# Patient Record
Sex: FEMALE | Race: WHITE | NOT HISPANIC OR LATINO | URBAN - METROPOLITAN AREA
[De-identification: names, ages, dates, MRNs, and addresses within clinical notes are randomized per-mention and may not be internally consistent; named-entity substitution may affect disease eponyms.]

---

## 2018-04-26 ENCOUNTER — INPATIENT (INPATIENT)
Facility: HOSPITAL | Age: 28
LOS: 1 days | Discharge: HOME | End: 2018-04-28
Attending: OBSTETRICS & GYNECOLOGY | Admitting: OBSTETRICS & GYNECOLOGY

## 2018-04-26 VITALS — TEMPERATURE: 99 F | DIASTOLIC BLOOD PRESSURE: 68 MMHG | RESPIRATION RATE: 18 BRPM | SYSTOLIC BLOOD PRESSURE: 121 MMHG

## 2018-04-26 LAB
BASOPHILS # BLD AUTO: 0.02 K/UL — SIGNIFICANT CHANGE UP (ref 0–0.2)
BASOPHILS NFR BLD AUTO: 0.2 % — SIGNIFICANT CHANGE UP (ref 0–1)
BLD GP AB SCN SERPL QL: SIGNIFICANT CHANGE UP
EOSINOPHIL # BLD AUTO: 0.07 K/UL — SIGNIFICANT CHANGE UP (ref 0–0.7)
EOSINOPHIL NFR BLD AUTO: 0.8 % — SIGNIFICANT CHANGE UP (ref 0–8)
HCT VFR BLD CALC: 28.5 % — LOW (ref 37–47)
HGB BLD-MCNC: 9 G/DL — LOW (ref 12–16)
IMM GRANULOCYTES NFR BLD AUTO: 0.6 % — HIGH (ref 0.1–0.3)
LYMPHOCYTES # BLD AUTO: 1.81 K/UL — SIGNIFICANT CHANGE UP (ref 1.2–3.4)
LYMPHOCYTES # BLD AUTO: 20.1 % — LOW (ref 20.5–51.1)
MCHC RBC-ENTMCNC: 25.5 PG — LOW (ref 27–31)
MCHC RBC-ENTMCNC: 31.6 G/DL — LOW (ref 32–37)
MCV RBC AUTO: 80.7 FL — LOW (ref 81–99)
MONOCYTES # BLD AUTO: 0.6 K/UL — SIGNIFICANT CHANGE UP (ref 0.1–0.6)
MONOCYTES NFR BLD AUTO: 6.7 % — SIGNIFICANT CHANGE UP (ref 1.7–9.3)
NEUTROPHILS # BLD AUTO: 6.45 K/UL — SIGNIFICANT CHANGE UP (ref 1.4–6.5)
NEUTROPHILS NFR BLD AUTO: 71.6 % — SIGNIFICANT CHANGE UP (ref 42.2–75.2)
NRBC # BLD: 0 /100 WBCS — SIGNIFICANT CHANGE UP (ref 0–0)
PLATELET # BLD AUTO: 197 K/UL — SIGNIFICANT CHANGE UP (ref 130–400)
PRENATAL SYPHILIS TEST: SIGNIFICANT CHANGE UP
RBC # BLD: 3.53 M/UL — LOW (ref 4.2–5.4)
RBC # FLD: 15.9 % — HIGH (ref 11.5–14.5)
TYPE + AB SCN PNL BLD: SIGNIFICANT CHANGE UP
WBC # BLD: 9 K/UL — SIGNIFICANT CHANGE UP (ref 4.8–10.8)
WBC # FLD AUTO: 9 K/UL — SIGNIFICANT CHANGE UP (ref 4.8–10.8)

## 2018-04-26 RX ORDER — ACETAMINOPHEN 500 MG
650 TABLET ORAL EVERY 6 HOURS
Qty: 0 | Refills: 0 | Status: DISCONTINUED | OUTPATIENT
Start: 2018-04-26 | End: 2018-04-28

## 2018-04-26 RX ORDER — DOCUSATE SODIUM 100 MG
100 CAPSULE ORAL
Qty: 0 | Refills: 0 | Status: DISCONTINUED | OUTPATIENT
Start: 2018-04-26 | End: 2018-04-28

## 2018-04-26 RX ORDER — SIMETHICONE 80 MG/1
80 TABLET, CHEWABLE ORAL EVERY 6 HOURS
Qty: 0 | Refills: 0 | Status: DISCONTINUED | OUTPATIENT
Start: 2018-04-26 | End: 2018-04-28

## 2018-04-26 RX ORDER — PRAMOXINE HYDROCHLORIDE 150 MG/15G
1 AEROSOL, FOAM RECTAL EVERY 4 HOURS
Qty: 0 | Refills: 0 | Status: DISCONTINUED | OUTPATIENT
Start: 2018-04-26 | End: 2018-04-28

## 2018-04-26 RX ORDER — MAGNESIUM HYDROXIDE 400 MG/1
30 TABLET, CHEWABLE ORAL
Qty: 0 | Refills: 0 | Status: DISCONTINUED | OUTPATIENT
Start: 2018-04-26 | End: 2018-04-28

## 2018-04-26 RX ORDER — LANOLIN
1 OINTMENT (GRAM) TOPICAL EVERY 6 HOURS
Qty: 0 | Refills: 0 | Status: DISCONTINUED | OUTPATIENT
Start: 2018-04-26 | End: 2018-04-28

## 2018-04-26 RX ORDER — DIPHENHYDRAMINE HCL 50 MG
25 CAPSULE ORAL EVERY 6 HOURS
Qty: 0 | Refills: 0 | Status: DISCONTINUED | OUTPATIENT
Start: 2018-04-26 | End: 2018-04-28

## 2018-04-26 RX ORDER — GLYCERIN ADULT
1 SUPPOSITORY, RECTAL RECTAL AT BEDTIME
Qty: 0 | Refills: 0 | Status: DISCONTINUED | OUTPATIENT
Start: 2018-04-26 | End: 2018-04-28

## 2018-04-26 RX ORDER — AER TRAVELER 0.5 G/1
1 SOLUTION RECTAL; TOPICAL EVERY 4 HOURS
Qty: 0 | Refills: 0 | Status: DISCONTINUED | OUTPATIENT
Start: 2018-04-26 | End: 2018-04-28

## 2018-04-26 RX ORDER — IBUPROFEN 200 MG
600 TABLET ORAL EVERY 6 HOURS
Qty: 0 | Refills: 0 | Status: DISCONTINUED | OUTPATIENT
Start: 2018-04-26 | End: 2018-04-28

## 2018-04-26 RX ORDER — OXYCODONE AND ACETAMINOPHEN 5; 325 MG/1; MG/1
1 TABLET ORAL
Qty: 0 | Refills: 0 | Status: DISCONTINUED | OUTPATIENT
Start: 2018-04-26 | End: 2018-04-28

## 2018-04-26 RX ORDER — HYDROCORTISONE 1 %
1 OINTMENT (GRAM) TOPICAL EVERY 4 HOURS
Qty: 0 | Refills: 0 | Status: DISCONTINUED | OUTPATIENT
Start: 2018-04-26 | End: 2018-04-28

## 2018-04-26 RX ORDER — SODIUM CHLORIDE 9 MG/ML
3 INJECTION INTRAMUSCULAR; INTRAVENOUS; SUBCUTANEOUS EVERY 8 HOURS
Qty: 0 | Refills: 0 | Status: DISCONTINUED | OUTPATIENT
Start: 2018-04-26 | End: 2018-04-28

## 2018-04-26 RX ORDER — DIBUCAINE 1 %
1 OINTMENT (GRAM) RECTAL EVERY 4 HOURS
Qty: 0 | Refills: 0 | Status: DISCONTINUED | OUTPATIENT
Start: 2018-04-26 | End: 2018-04-28

## 2018-04-26 RX ORDER — OXYTOCIN 10 UNIT/ML
41.67 VIAL (ML) INJECTION
Qty: 20 | Refills: 0 | Status: DISCONTINUED | OUTPATIENT
Start: 2018-04-26 | End: 2018-04-28

## 2018-04-26 RX ADMIN — Medication 1 TABLET(S): at 21:40

## 2018-04-26 RX ADMIN — Medication 600 MILLIGRAM(S): at 17:04

## 2018-04-26 RX ADMIN — SODIUM CHLORIDE 3 MILLILITER(S): 9 INJECTION INTRAMUSCULAR; INTRAVENOUS; SUBCUTANEOUS at 21:39

## 2018-04-26 NOTE — OB PROVIDER H&P - NSHPPHYSICALEXAM_GEN_ALL_CORE
Vital Signs Last 24 Hrs  T(C): --  T(F): --  HR: 100 (26 Apr 2018 16:16) (100 - 100)  BP: 148/60 (26 Apr 2018 16:16) (148/60 - 148/60)  Infant in mothers arm, placenta still in situ

## 2018-04-26 NOTE — OB PROVIDER H&P - HISTORY OF PRESENT ILLNESS
26 yo  s/p  at ambulance en route to hospital. Reports CTX since this morning and LOF. Denies any complications in this pregnancy.

## 2018-04-26 NOTE — OB PROVIDER H&P - NSHPLABSRESULTS_GEN_ALL_CORE
Declined anatomy scan  Office sonogram: Posterior placenta, 3VC, 4CH, EFW 354g (45%ile)    GCT 85  Refused MSAFP

## 2018-04-26 NOTE — OB PROVIDER DELIVERY SUMMARY - NSPROVIDERDELIVERYNOTE_OBGYN_ALL_OB_FT
Patient had extramural delivery of male infant, apgars 9 and 9 via [facility] ambulance. Placenta delivered intact spontaneously. Uterine massage and pitocin started. Lidocaine injected into laceration. Second degree laceration repaired with 3-0 chromic. Good hemostasis obtained.  Pateint tolerated procedure well.

## 2018-04-26 NOTE — OB PROVIDER H&P - ASSESSMENT
26 yo  s/p extramural , doing well  - pain management prn  - transfer to 4D  - regular diet    Dr. Dangelo at bedside

## 2018-04-27 LAB
BASOPHILS # BLD AUTO: 0.02 K/UL — SIGNIFICANT CHANGE UP (ref 0–0.2)
BASOPHILS NFR BLD AUTO: 0.2 % — SIGNIFICANT CHANGE UP (ref 0–1)
EOSINOPHIL # BLD AUTO: 0.1 K/UL — SIGNIFICANT CHANGE UP (ref 0–0.7)
EOSINOPHIL NFR BLD AUTO: 1.1 % — SIGNIFICANT CHANGE UP (ref 0–8)
HCT VFR BLD CALC: 26.1 % — LOW (ref 37–47)
HGB BLD-MCNC: 8.3 G/DL — LOW (ref 12–16)
IMM GRANULOCYTES NFR BLD AUTO: 0.6 % — HIGH (ref 0.1–0.3)
LYMPHOCYTES # BLD AUTO: 2.68 K/UL — SIGNIFICANT CHANGE UP (ref 1.2–3.4)
LYMPHOCYTES # BLD AUTO: 30.1 % — SIGNIFICANT CHANGE UP (ref 20.5–51.1)
MCHC RBC-ENTMCNC: 25.9 PG — LOW (ref 27–31)
MCHC RBC-ENTMCNC: 31.8 G/DL — LOW (ref 32–37)
MCV RBC AUTO: 81.3 FL — SIGNIFICANT CHANGE UP (ref 81–99)
MONOCYTES # BLD AUTO: 0.62 K/UL — HIGH (ref 0.1–0.6)
MONOCYTES NFR BLD AUTO: 7 % — SIGNIFICANT CHANGE UP (ref 1.7–9.3)
NEUTROPHILS # BLD AUTO: 5.43 K/UL — SIGNIFICANT CHANGE UP (ref 1.4–6.5)
NEUTROPHILS NFR BLD AUTO: 61 % — SIGNIFICANT CHANGE UP (ref 42.2–75.2)
NRBC # BLD: 0 /100 WBCS — SIGNIFICANT CHANGE UP (ref 0–0)
PLATELET # BLD AUTO: 191 K/UL — SIGNIFICANT CHANGE UP (ref 130–400)
RBC # BLD: 3.21 M/UL — LOW (ref 4.2–5.4)
RBC # FLD: 16 % — HIGH (ref 11.5–14.5)
WBC # BLD: 8.9 K/UL — SIGNIFICANT CHANGE UP (ref 4.8–10.8)
WBC # FLD AUTO: 8.9 K/UL — SIGNIFICANT CHANGE UP (ref 4.8–10.8)

## 2018-04-27 RX ADMIN — Medication 600 MILLIGRAM(S): at 16:48

## 2018-04-27 RX ADMIN — Medication 600 MILLIGRAM(S): at 17:20

## 2018-04-27 RX ADMIN — Medication 600 MILLIGRAM(S): at 04:57

## 2018-04-27 RX ADMIN — Medication 600 MILLIGRAM(S): at 05:45

## 2018-04-27 RX ADMIN — Medication 1 TABLET(S): at 11:20

## 2018-04-27 RX ADMIN — SODIUM CHLORIDE 3 MILLILITER(S): 9 INJECTION INTRAMUSCULAR; INTRAVENOUS; SUBCUTANEOUS at 14:11

## 2018-04-27 NOTE — PROGRESS NOTE ADULT - SUBJECTIVE AND OBJECTIVE BOX
OB attending  PPD #1    Pt doing well, pain well controlled. No overnight events, no acute complaints.    Ambulating: Yes  Voiding: Yes  Flatus: Yes  Bowel movements: Yes   Breast or bottle feeding: Breastfeeding  Diet: Regular    PAST MEDICAL & SURGICAL HISTORY:  No pertinent past medical history  No significant past surgical history    Physical Exam  Vital Signs Last 24 Hrs  T(C): 35.6 (2018 00:46), Max: 37 (2018 16:00)  T(F): 96.1 (2018 00:46), Max: 98.6 (2018 16:00)  HR: 82 (2018 00:46) (65 - 100)  BP: 110/54 (2018 00:46) (110/54 - 148/60)  BP(mean): --  RR: 19 (2018 00:46) (18 - 20)  SpO2: --  Gen: AAOx3, NAD  Abd: Soft, nontender, nondistended, BS+  Fundus: Firm, below umbilicus  Lochia: normal  Ext: No calf tenderness, no swelling    Labs:                        9.0    9.00  )-----------( 197      ( 2018 16:46 )             28.5       A/P:26 yo , s/p extramural , PPD #1, doing well  - continue current management

## 2018-04-28 ENCOUNTER — TRANSCRIPTION ENCOUNTER (OUTPATIENT)
Age: 28
End: 2018-04-28

## 2018-04-28 VITALS
SYSTOLIC BLOOD PRESSURE: 112 MMHG | RESPIRATION RATE: 18 BRPM | DIASTOLIC BLOOD PRESSURE: 55 MMHG | HEART RATE: 86 BPM | TEMPERATURE: 98 F

## 2018-04-28 RX ORDER — IBUPROFEN 200 MG
1 TABLET ORAL
Qty: 0 | Refills: 0 | DISCHARGE
Start: 2018-04-28

## 2018-04-28 RX ADMIN — Medication 600 MILLIGRAM(S): at 08:42

## 2018-04-28 RX ADMIN — Medication 600 MILLIGRAM(S): at 19:37

## 2018-04-28 NOTE — DISCHARGE NOTE OB - PATIENT PORTAL LINK FT
You can access the [facility], offered by [facility], by registering with the following website: [email or web address]

## 2018-04-28 NOTE — DISCHARGE NOTE OB - PLAN OF CARE
healthy patient nothing in the vagina for 6 weeks, no tampons, no douching, no baths, no sex. Showers are fine.

## 2018-04-28 NOTE — DISCHARGE NOTE OB - MEDICATION SUMMARY - MEDICATIONS TO TAKE
I will START or STAY ON the medications listed below when I get home from the hospital:    ibuprofen 600 mg oral tablet  -- 1 tab(s) by mouth every 6 hours, As needed, Moderate Pain  -- Indication: For EXTRA MURAL

## 2018-04-28 NOTE — DISCHARGE NOTE OB - CARE PLAN
Principal Discharge DX:	Vaginal delivery  Goal:	healthy patient  Assessment and plan of treatment:	nothing in the vagina for 6 weeks, no tampons, no douching, no baths, no sex. Showers are fine.

## 2018-04-28 NOTE — DISCHARGE NOTE OB - HOSPITAL COURSE
extramural delivery, uncomplicated postpartum stay                        8.3    8.90  )-----------( 191      ( 27 Apr 2018 18:18 )             26.1

## 2018-04-28 NOTE — DISCHARGE NOTE OB - CARE PROVIDER_API CALL
Darrell Cool), Obstetrics and Gynecology  [address] [place] [address]  Phone: [phone]  Fax: [phone]

## 2018-05-03 DIAGNOSIS — Z3A.41 41 WEEKS GESTATION OF PREGNANCY: ICD-10-CM

## 2020-05-13 ENCOUNTER — ASOB RESULT (OUTPATIENT)
Age: 30
End: 2020-05-13

## 2020-05-13 ENCOUNTER — APPOINTMENT (OUTPATIENT)
Dept: ANTEPARTUM | Facility: CLINIC | Age: 30
End: 2020-05-13
Payer: MEDICAID

## 2020-05-13 PROCEDURE — 76811 OB US DETAILED SNGL FETUS: CPT

## 2020-09-30 ENCOUNTER — INPATIENT (INPATIENT)
Facility: HOSPITAL | Age: 30
LOS: 1 days | Discharge: HOME | End: 2020-10-02
Attending: OBSTETRICS & GYNECOLOGY | Admitting: OBSTETRICS & GYNECOLOGY
Payer: MEDICAID

## 2020-09-30 ENCOUNTER — OUTPATIENT (OUTPATIENT)
Dept: OUTPATIENT SERVICES | Facility: HOSPITAL | Age: 30
LOS: 1 days | Discharge: HOME | End: 2020-09-30

## 2020-09-30 VITALS
HEART RATE: 82 BPM | DIASTOLIC BLOOD PRESSURE: 68 MMHG | RESPIRATION RATE: 16 BRPM | SYSTOLIC BLOOD PRESSURE: 118 MMHG | TEMPERATURE: 98 F

## 2020-09-30 VITALS — SYSTOLIC BLOOD PRESSURE: 118 MMHG | TEMPERATURE: 99 F | DIASTOLIC BLOOD PRESSURE: 68 MMHG

## 2020-09-30 VITALS — DIASTOLIC BLOOD PRESSURE: 67 MMHG | HEART RATE: 116 BPM | SYSTOLIC BLOOD PRESSURE: 129 MMHG

## 2020-09-30 LAB
AMPHET UR-MCNC: NEGATIVE — SIGNIFICANT CHANGE UP
APPEARANCE UR: ABNORMAL
BACTERIA # UR AUTO: NEGATIVE — SIGNIFICANT CHANGE UP
BARBITURATES UR SCN-MCNC: NEGATIVE — SIGNIFICANT CHANGE UP
BASOPHILS # BLD AUTO: 0.04 K/UL — SIGNIFICANT CHANGE UP (ref 0–0.2)
BASOPHILS NFR BLD AUTO: 0.4 % — SIGNIFICANT CHANGE UP (ref 0–1)
BENZODIAZ UR-MCNC: NEGATIVE — SIGNIFICANT CHANGE UP
BILIRUB UR-MCNC: NEGATIVE — SIGNIFICANT CHANGE UP
BLD GP AB SCN SERPL QL: SIGNIFICANT CHANGE UP
BUPRENORPHINE SCREEN, URINE RESULT: NEGATIVE — SIGNIFICANT CHANGE UP
COCAINE METAB.OTHER UR-MCNC: NEGATIVE — SIGNIFICANT CHANGE UP
COLOR SPEC: SIGNIFICANT CHANGE UP
DIFF PNL FLD: SIGNIFICANT CHANGE UP
EOSINOPHIL # BLD AUTO: 0.11 K/UL — SIGNIFICANT CHANGE UP (ref 0–0.7)
EOSINOPHIL NFR BLD AUTO: 1.2 % — SIGNIFICANT CHANGE UP (ref 0–8)
EPI CELLS # UR: 3 /HPF — SIGNIFICANT CHANGE UP (ref 0–5)
FENTANYL UR QL: NEGATIVE — SIGNIFICANT CHANGE UP
GLUCOSE UR QL: NEGATIVE — SIGNIFICANT CHANGE UP
HCT VFR BLD CALC: 37.6 % — SIGNIFICANT CHANGE UP (ref 37–47)
HGB BLD-MCNC: 12.2 G/DL — SIGNIFICANT CHANGE UP (ref 12–16)
HYALINE CASTS # UR AUTO: 3 /LPF — SIGNIFICANT CHANGE UP (ref 0–7)
IMM GRANULOCYTES NFR BLD AUTO: 0.8 % — HIGH (ref 0.1–0.3)
KETONES UR-MCNC: NEGATIVE — SIGNIFICANT CHANGE UP
L&D DRUG SCREEN, URINE: SIGNIFICANT CHANGE UP
LEUKOCYTE ESTERASE UR-ACNC: NEGATIVE — SIGNIFICANT CHANGE UP
LYMPHOCYTES # BLD AUTO: 2.46 K/UL — SIGNIFICANT CHANGE UP (ref 1.2–3.4)
LYMPHOCYTES # BLD AUTO: 25.8 % — SIGNIFICANT CHANGE UP (ref 20.5–51.1)
MCHC RBC-ENTMCNC: 28.9 PG — SIGNIFICANT CHANGE UP (ref 27–31)
MCHC RBC-ENTMCNC: 32.4 G/DL — SIGNIFICANT CHANGE UP (ref 32–37)
MCV RBC AUTO: 89.1 FL — SIGNIFICANT CHANGE UP (ref 81–99)
METHADONE UR-MCNC: NEGATIVE — SIGNIFICANT CHANGE UP
MONOCYTES # BLD AUTO: 0.85 K/UL — HIGH (ref 0.1–0.6)
MONOCYTES NFR BLD AUTO: 8.9 % — SIGNIFICANT CHANGE UP (ref 1.7–9.3)
NEUTROPHILS # BLD AUTO: 6.01 K/UL — SIGNIFICANT CHANGE UP (ref 1.4–6.5)
NEUTROPHILS NFR BLD AUTO: 62.9 % — SIGNIFICANT CHANGE UP (ref 42.2–75.2)
NITRITE UR-MCNC: NEGATIVE — SIGNIFICANT CHANGE UP
NRBC # BLD: 0 /100 WBCS — SIGNIFICANT CHANGE UP (ref 0–0)
OPIATES UR-MCNC: NEGATIVE — SIGNIFICANT CHANGE UP
OXYCODONE UR-MCNC: NEGATIVE — SIGNIFICANT CHANGE UP
PCP UR-MCNC: NEGATIVE — SIGNIFICANT CHANGE UP
PH UR: 7 — SIGNIFICANT CHANGE UP (ref 5–8)
PLATELET # BLD AUTO: 193 K/UL — SIGNIFICANT CHANGE UP (ref 130–400)
PRENATAL SYPHILIS TEST: SIGNIFICANT CHANGE UP
PROPOXYPHENE QUALITATIVE URINE RESULT: NEGATIVE — SIGNIFICANT CHANGE UP
PROT UR-MCNC: SIGNIFICANT CHANGE UP
RBC # BLD: 4.22 M/UL — SIGNIFICANT CHANGE UP (ref 4.2–5.4)
RBC # FLD: 14.6 % — HIGH (ref 11.5–14.5)
RBC CASTS # UR COMP ASSIST: 4 /HPF — SIGNIFICANT CHANGE UP (ref 0–4)
SARS-COV-2 RNA SPEC QL NAA+PROBE: SIGNIFICANT CHANGE UP
SP GR SPEC: 1.01 — SIGNIFICANT CHANGE UP (ref 1.01–1.03)
UROBILINOGEN FLD QL: SIGNIFICANT CHANGE UP
WBC # BLD: 9.55 K/UL — SIGNIFICANT CHANGE UP (ref 4.8–10.8)
WBC # FLD AUTO: 9.55 K/UL — SIGNIFICANT CHANGE UP (ref 4.8–10.8)
WBC UR QL: 2 /HPF — SIGNIFICANT CHANGE UP (ref 0–5)

## 2020-09-30 PROCEDURE — 59400 OBSTETRICAL CARE: CPT | Mod: U9

## 2020-09-30 RX ORDER — OXYCODONE HYDROCHLORIDE 5 MG/1
5 TABLET ORAL
Refills: 0 | Status: DISCONTINUED | OUTPATIENT
Start: 2020-09-30 | End: 2020-10-02

## 2020-09-30 RX ORDER — NALOXONE HYDROCHLORIDE 4 MG/.1ML
0.1 SPRAY NASAL
Refills: 0 | Status: DISCONTINUED | OUTPATIENT
Start: 2020-09-30 | End: 2020-10-02

## 2020-09-30 RX ORDER — LANOLIN
1 OINTMENT (GRAM) TOPICAL EVERY 6 HOURS
Refills: 0 | Status: DISCONTINUED | OUTPATIENT
Start: 2020-09-30 | End: 2020-10-02

## 2020-09-30 RX ORDER — DEXAMETHASONE 0.5 MG/5ML
4 ELIXIR ORAL EVERY 6 HOURS
Refills: 0 | Status: DISCONTINUED | OUTPATIENT
Start: 2020-09-30 | End: 2020-10-02

## 2020-09-30 RX ORDER — DIBUCAINE 1 %
1 OINTMENT (GRAM) RECTAL EVERY 6 HOURS
Refills: 0 | Status: DISCONTINUED | OUTPATIENT
Start: 2020-09-30 | End: 2020-10-02

## 2020-09-30 RX ORDER — HYDROCORTISONE 1 %
1 OINTMENT (GRAM) TOPICAL EVERY 6 HOURS
Refills: 0 | Status: DISCONTINUED | OUTPATIENT
Start: 2020-09-30 | End: 2020-10-02

## 2020-09-30 RX ORDER — IBUPROFEN 200 MG
600 TABLET ORAL EVERY 6 HOURS
Refills: 0 | Status: COMPLETED | OUTPATIENT
Start: 2020-09-30 | End: 2021-08-29

## 2020-09-30 RX ORDER — AER TRAVELER 0.5 G/1
1 SOLUTION RECTAL; TOPICAL EVERY 4 HOURS
Refills: 0 | Status: DISCONTINUED | OUTPATIENT
Start: 2020-09-30 | End: 2020-10-02

## 2020-09-30 RX ORDER — OXYTOCIN 10 UNIT/ML
333.33 VIAL (ML) INJECTION
Qty: 20 | Refills: 0 | Status: DISCONTINUED | OUTPATIENT
Start: 2020-09-30 | End: 2020-10-02

## 2020-09-30 RX ORDER — DIPHENHYDRAMINE HCL 50 MG
25 CAPSULE ORAL EVERY 6 HOURS
Refills: 0 | Status: DISCONTINUED | OUTPATIENT
Start: 2020-09-30 | End: 2020-10-02

## 2020-09-30 RX ORDER — KETOROLAC TROMETHAMINE 30 MG/ML
30 SYRINGE (ML) INJECTION ONCE
Refills: 0 | Status: DISCONTINUED | OUTPATIENT
Start: 2020-09-30 | End: 2020-09-30

## 2020-09-30 RX ORDER — SIMETHICONE 80 MG/1
80 TABLET, CHEWABLE ORAL EVERY 4 HOURS
Refills: 0 | Status: DISCONTINUED | OUTPATIENT
Start: 2020-09-30 | End: 2020-10-02

## 2020-09-30 RX ORDER — SODIUM CHLORIDE 9 MG/ML
1000 INJECTION, SOLUTION INTRAVENOUS
Refills: 0 | Status: DISCONTINUED | OUTPATIENT
Start: 2020-09-30 | End: 2020-09-30

## 2020-09-30 RX ORDER — IBUPROFEN 200 MG
600 TABLET ORAL EVERY 6 HOURS
Refills: 0 | Status: DISCONTINUED | OUTPATIENT
Start: 2020-09-30 | End: 2020-10-02

## 2020-09-30 RX ORDER — ONDANSETRON 8 MG/1
4 TABLET, FILM COATED ORAL EVERY 6 HOURS
Refills: 0 | Status: DISCONTINUED | OUTPATIENT
Start: 2020-09-30 | End: 2020-10-02

## 2020-09-30 RX ORDER — BUPIVACAINE HCL/PF 7.5 MG/ML
200 VIAL (ML) INJECTION
Refills: 0 | Status: DISCONTINUED | OUTPATIENT
Start: 2020-09-30 | End: 2020-10-02

## 2020-09-30 RX ORDER — MAGNESIUM HYDROXIDE 400 MG/1
30 TABLET, CHEWABLE ORAL
Refills: 0 | Status: DISCONTINUED | OUTPATIENT
Start: 2020-09-30 | End: 2020-10-02

## 2020-09-30 RX ORDER — PRAMOXINE HYDROCHLORIDE 150 MG/15G
1 AEROSOL, FOAM RECTAL EVERY 4 HOURS
Refills: 0 | Status: DISCONTINUED | OUTPATIENT
Start: 2020-09-30 | End: 2020-10-02

## 2020-09-30 RX ORDER — ACETAMINOPHEN 500 MG
975 TABLET ORAL
Refills: 0 | Status: DISCONTINUED | OUTPATIENT
Start: 2020-09-30 | End: 2020-10-02

## 2020-09-30 RX ORDER — BENZOCAINE 10 %
1 GEL (GRAM) MUCOUS MEMBRANE EVERY 6 HOURS
Refills: 0 | Status: DISCONTINUED | OUTPATIENT
Start: 2020-09-30 | End: 2020-10-02

## 2020-09-30 RX ORDER — SODIUM CHLORIDE 9 MG/ML
3 INJECTION INTRAMUSCULAR; INTRAVENOUS; SUBCUTANEOUS EVERY 8 HOURS
Refills: 0 | Status: DISCONTINUED | OUTPATIENT
Start: 2020-09-30 | End: 2020-10-02

## 2020-09-30 RX ADMIN — SODIUM CHLORIDE 3 MILLILITER(S): 9 INJECTION INTRAMUSCULAR; INTRAVENOUS; SUBCUTANEOUS at 15:39

## 2020-09-30 RX ADMIN — Medication 30 MILLIGRAM(S): at 10:23

## 2020-09-30 RX ADMIN — Medication 1000 MILLIUNIT(S)/MIN: at 10:25

## 2020-09-30 RX ADMIN — Medication 1 TABLET(S): at 13:05

## 2020-09-30 RX ADMIN — Medication 975 MILLIGRAM(S): at 20:44

## 2020-09-30 RX ADMIN — Medication 30 MILLIGRAM(S): at 11:00

## 2020-09-30 NOTE — OB PROVIDER TRIAGE NOTE - NSHPPHYSICALEXAM_GEN_ALL_CORE
Vital Signs Last 24 Hrs  T(C): 36.7 (30 Sep 2020 02:58), Max: 37 (30 Sep 2020 02:53)  T(F): 98.1 (30 Sep 2020 02:58), Max: 98.6 (30 Sep 2020 02:53)  HR: 82 (30 Sep 2020 02:58) (82 - 82)  BP: 118/68 (30 Sep 2020 02:58) (118/68 - 118/68)  RR: 16 (30 Sep 2020 02:58) (16 - 16)    EFM: 135/moderate/+accels  [place]: q8min  SVE: 3/50/-3, vertex, intact   Abd: soft, nontender, gravid, palpable ctx  BSS: BPP 8/8, MVP 3.6cm

## 2020-09-30 NOTE — OB PROVIDER TRIAGE NOTE - HISTORY OF PRESENT ILLNESS
31yo  @41w2d, GBS neg, presents with contractions starting 2 hours ago, q15-20m, 5/10 pain. Denies LOF, vaginal bleeding. Good FM. Denies any complications during this pregnancy.

## 2020-09-30 NOTE — OB RN PATIENT PROFILE - CONTRAINDICATIONS & PRECAUTIONS (SELECT ALL THAT APPLY)
Patient/surrogate refused vaccine... Patient/surrogate refused vaccine.../Severe allergy/sensitivity to eggs/thimerosal/other vaccine components or previous serious reaction to vaccine

## 2020-09-30 NOTE — OB PROVIDER H&P - NSHPPHYSICALEXAM_GEN_ALL_CORE
Vital Signs Last 24 Hrs  T(C): 36.7 (30 Sep 2020 05:24), Max: 37 (30 Sep 2020 02:53)  T(F): 98 (30 Sep 2020 05:24), Max: 98.6 (30 Sep 2020 02:53)  HR: 116 (30 Sep 2020 05:24) (82 - 116)  BP: 129/67 (30 Sep 2020 05:24) (118/68 - 129/67)    RR: 18 (30 Sep 2020 05:24) (16 - 18)  EFM: 135/moderate/+accels  [place]: q4min  SVE: 5/50/-2, vertex, intact   Abd: soft, nontender, gravid, palpable ctx

## 2020-09-30 NOTE — OB PROVIDER TRIAGE NOTE - NSOBPROVIDERNOTE_OBGYN_ALL_OB_FT
31yo  @41w2d, GBS neg, in latent labor    -Labor precautions given  -Patient to ambulate and return for re-evaluation  -Discharge to Home    Dr. Thakkar and Dr. Winston aware 31yo  @41w2d, GBS neg, in latent labor    -Labor precautions given  - PO hydration  -Patient to ambulate and return for re-evaluation      Dr. Thakkar and Dr. Winston aware

## 2020-09-30 NOTE — OB PROVIDER DELIVERY SUMMARY - NSPROVIDERDELIVERYNOTE_OBGYN_ALL_OB_FT
Patient was fully dilated and pushed. NSD live , Apgars 9/9. Vtx delivered  direct OP  ,  Fetal head restituted to LOT. peds present for meconium stained fluid. The anterior and posterior shoulders delivered, followed by the remaining body atraumatically. Delayed cord clamping was performed, and then clamped and cut. Cord blood collected. The  was handed to mother for skin to skin. The placenta delivered spontaneously intact with 3v cord. Uterus massaged and firm with oxytocin given IV. Cervix, vagina and perineum inspected . Repair of a small first degree perineal laceration , in the usual fashion with 2-0 chromic.   EBL -400cc hemostasis assured.

## 2020-09-30 NOTE — OB PROVIDER TRIAGE NOTE - NSHPLABSRESULTS_GEN_ALL_CORE
2/12/2020  Type and Screen: O pos   Antibody screen: neg   Measles: immune  Rubella: immune  Varicella: immune   Mumps: non-immune  RPR: neg  HbSAg: neg  HIV: NR  Parvo: immune  Lead: neg    Second Trimester:  1 hour Glucola: 112    09/03/2020  Third Trimester:  HIV: NR  RPR: NR  GBS: neg

## 2020-09-30 NOTE — OB PROVIDER H&P - ATTENDING COMMENTS
Patient is a 31yo  @41w2d, GBS neg, s/p ambulation now in labor.  -admit   -iv access  -patient requests epidural  -arom after epidural

## 2020-09-30 NOTE — OB PROVIDER H&P - ASSESSMENT
29yo  @41w2d, GBS neg, in labor.    -Admit to L&D  -Admission labs  -Monitor vitals  -Cont EFM/Diggins  -Pain management prn  -Clear liquid diet    Dr. Thakkar and Dr. Winston aware

## 2020-09-30 NOTE — OB PROVIDER H&P - HISTORY OF PRESENT ILLNESS
29yo  @41w2d, GBS neg, presents with contractions starting 0130, q15-20m, 5/10 pain. Denies LOF, vaginal bleeding. Good FM. Denies any complications during this pregnancy.    Patient ambulated and returned for re-evaluation. Contractions are now closer together and worsening pain 8/10, patient desiring pain control. Denies LOF, vaginal bleeding. Good FM.

## 2020-10-01 DIAGNOSIS — Z02.9 ENCOUNTER FOR ADMINISTRATIVE EXAMINATIONS, UNSPECIFIED: ICD-10-CM

## 2020-10-01 LAB
BASOPHILS # BLD AUTO: 0.03 K/UL — SIGNIFICANT CHANGE UP (ref 0–0.2)
BASOPHILS NFR BLD AUTO: 0.3 % — SIGNIFICANT CHANGE UP (ref 0–1)
EOSINOPHIL # BLD AUTO: 0.19 K/UL — SIGNIFICANT CHANGE UP (ref 0–0.7)
EOSINOPHIL NFR BLD AUTO: 1.9 % — SIGNIFICANT CHANGE UP (ref 0–8)
HCT VFR BLD CALC: 31.2 % — LOW (ref 37–47)
HGB BLD-MCNC: 9.9 G/DL — LOW (ref 12–16)
IMM GRANULOCYTES NFR BLD AUTO: 0.7 % — HIGH (ref 0.1–0.3)
LYMPHOCYTES # BLD AUTO: 3.27 K/UL — SIGNIFICANT CHANGE UP (ref 1.2–3.4)
LYMPHOCYTES # BLD AUTO: 32.9 % — SIGNIFICANT CHANGE UP (ref 20.5–51.1)
MCHC RBC-ENTMCNC: 28.8 PG — SIGNIFICANT CHANGE UP (ref 27–31)
MCHC RBC-ENTMCNC: 31.7 G/DL — LOW (ref 32–37)
MCV RBC AUTO: 90.7 FL — SIGNIFICANT CHANGE UP (ref 81–99)
MONOCYTES # BLD AUTO: 0.82 K/UL — HIGH (ref 0.1–0.6)
MONOCYTES NFR BLD AUTO: 8.2 % — SIGNIFICANT CHANGE UP (ref 1.7–9.3)
NEUTROPHILS # BLD AUTO: 5.56 K/UL — SIGNIFICANT CHANGE UP (ref 1.4–6.5)
NEUTROPHILS NFR BLD AUTO: 56 % — SIGNIFICANT CHANGE UP (ref 42.2–75.2)
NRBC # BLD: 0 /100 WBCS — SIGNIFICANT CHANGE UP (ref 0–0)
PLATELET # BLD AUTO: 164 K/UL — SIGNIFICANT CHANGE UP (ref 130–400)
RBC # BLD: 3.44 M/UL — LOW (ref 4.2–5.4)
RBC # FLD: 14.7 % — HIGH (ref 11.5–14.5)
WBC # BLD: 9.94 K/UL — SIGNIFICANT CHANGE UP (ref 4.8–10.8)
WBC # FLD AUTO: 9.94 K/UL — SIGNIFICANT CHANGE UP (ref 4.8–10.8)

## 2020-10-01 RX ADMIN — SODIUM CHLORIDE 3 MILLILITER(S): 9 INJECTION INTRAMUSCULAR; INTRAVENOUS; SUBCUTANEOUS at 13:16

## 2020-10-01 RX ADMIN — Medication 975 MILLIGRAM(S): at 08:45

## 2020-10-01 RX ADMIN — Medication 1 TABLET(S): at 12:43

## 2020-10-01 RX ADMIN — Medication 600 MILLIGRAM(S): at 19:30

## 2020-10-01 RX ADMIN — Medication 975 MILLIGRAM(S): at 14:29

## 2020-10-01 RX ADMIN — Medication 975 MILLIGRAM(S): at 16:15

## 2020-10-01 RX ADMIN — Medication 975 MILLIGRAM(S): at 09:00

## 2020-10-01 RX ADMIN — SODIUM CHLORIDE 3 MILLILITER(S): 9 INJECTION INTRAMUSCULAR; INTRAVENOUS; SUBCUTANEOUS at 06:11

## 2020-10-01 RX ADMIN — Medication 600 MILLIGRAM(S): at 18:54

## 2020-10-01 NOTE — PROGRESS NOTE ADULT - SUBJECTIVE AND OBJECTIVE BOX
OB attending  PPD #1    Pt doing well, pain well controlled. No overnight events, no acute complaints.    Ambulating: Yes  Voiding: Yes  Flatus: Yes  Bowel movements: Yes   Breast or bottle feeding: Breastfeeding  Diet: Regular    PAST MEDICAL & SURGICAL HISTORY:  No pertinent past medical history    No significant past surgical history        Physical Exam  Vital Signs Last 24 Hrs  T(C): 36.7 (30 Sep 2020 23:15), Max: 36.7 (30 Sep 2020 12:35)  T(F): 98.1 (30 Sep 2020 23:15), Max: 98.1 (30 Sep 2020 23:15)  HR: 73 (30 Sep 2020 23:15) (51 - 168)  BP: 105/58 (30 Sep 2020 23:15) (78/48 - 143/63)  BP(mean): --  RR: 18 (30 Sep 2020 23:15) (18 - 18)  SpO2: 98% (30 Sep 2020 09:54) (85% - 100%)  Gen: AAOx3, NAD  Abd: Soft, nontender, nondistended, BS+  Fundus: Firm, below umbilicus  Lochia: normal  Ext: No calf tenderness, no swelling    Labs:                        9.9    9.94  )-----------( 164      ( 01 Oct 2020 05:28 )             31.2         A/P:29 yo P6, s/p , PPD #1, doing well  - continue current management

## 2020-10-02 ENCOUNTER — TRANSCRIPTION ENCOUNTER (OUTPATIENT)
Age: 30
End: 2020-10-02

## 2020-10-02 VITALS
DIASTOLIC BLOOD PRESSURE: 59 MMHG | HEART RATE: 61 BPM | SYSTOLIC BLOOD PRESSURE: 115 MMHG | RESPIRATION RATE: 18 BRPM | TEMPERATURE: 97 F

## 2020-10-02 RX ORDER — ACETAMINOPHEN 500 MG
3 TABLET ORAL
Qty: 0 | Refills: 0 | DISCHARGE
Start: 2020-10-02

## 2020-10-02 RX ADMIN — Medication 600 MILLIGRAM(S): at 12:30

## 2020-10-02 RX ADMIN — Medication 1 TABLET(S): at 11:35

## 2020-10-02 RX ADMIN — Medication 600 MILLIGRAM(S): at 06:45

## 2020-10-02 RX ADMIN — Medication 600 MILLIGRAM(S): at 11:35

## 2020-10-02 NOTE — DISCHARGE NOTE OB - HOSPITAL COURSE
10-02-20 @ 10:38    HPI:  31yo  @41w2d, GBS neg, presents with contractions starting 0130, q15-20m, 5/10 pain. Denies LOF, vaginal bleeding. Good FM. Denies any complications during this pregnancy.    Patient ambulated and returned for re-evaluation. Contractions are now closer together and worsening pain 8/10, patient desiring pain control. Denies LOF, vaginal bleeding. Good FM. (30 Sep 2020 05:28)      PAST MEDICAL & SURGICAL HISTORY:  No pertinent past medical history    No significant past surgical history        POST PARTUM COURSE:   uncomplicated, discharge       LABS:                        9.9    9.94  )-----------( 164      ( 01 Oct 2020 05:28 )             31.2                   Allergies    No Known Allergies    Intolerances

## 2020-10-02 NOTE — DISCHARGE NOTE OB - MEDICATION SUMMARY - MEDICATIONS TO TAKE
I will START or STAY ON the medications listed below when I get home from the hospital:    ibuprofen 600 mg oral tablet  -- 1 tab(s) by mouth every 6 hours, As needed, Moderate Pain  -- Indication: For pain    acetaminophen 325 mg oral tablet  -- 3 tab(s) by mouth every 6 hours, As Needed  -- Indication: For pain

## 2020-10-02 NOTE — PROGRESS NOTE ADULT - SUBJECTIVE AND OBJECTIVE BOX
OB attending  PPD #2    Pt doing well, pain well controlled. No overnight events, no acute complaints.    Ambulating: Yes  Voiding: Yes  Flatus: Yes  Bowel movements: Yes   Breast or bottle feeding: Breastfeeding  Diet: Regular    PAST MEDICAL & SURGICAL HISTORY:  No pertinent past medical history    No significant past surgical history        Physical Exam  Vital Signs Last 24 Hrs  T(C): 36.3 (01 Oct 2020 23:43), Max: 36.4 (01 Oct 2020 15:30)  T(F): 97.4 (01 Oct 2020 23:43), Max: 97.5 (01 Oct 2020 15:30)  HR: 74 (01 Oct 2020 23:43) (74 - 97)  BP: 116/55 (01 Oct 2020 23:43) (103/49 - 116/55)  BP(mean): --  RR: 18 (01 Oct 2020 23:43) (18 - 18)  SpO2: --  Gen: AAOx3, NAD  Abd: Soft, nontender, nondistended, BS+  Fundus: Firm, below umbilicus  Lochia: normal  Ext: No calf tenderness, no swelling    Labs:                        9.9    9.94  )-----------( 164      ( 01 Oct 2020 05:28 )             31.2         A/P:  s/p , PPD #2, doing well  - continue current management  d/c home

## 2020-10-02 NOTE — DISCHARGE NOTE OB - PATIENT PORTAL LINK FT
You can access the FollowMyHealth Patient Portal offered by [facility] by registering at the following website: [email or web address]. By joining [employer]’s FollowMyHealth portal, you will also be able to view your health information using other applications (apps) compatible with our system.

## 2020-10-02 NOTE — DISCHARGE NOTE OB - CARE PROVIDER_API CALL
Darrell Cool  OBSTETRICS AND GYNECOLOGY  [address] [id number]  Phone: [phone]  Fax: [phone]  Follow Up Time:

## 2020-10-02 NOTE — DISCHARGE NOTE OB - CARE PLAN
Principal Discharge DX:	Vaginal delivery  Goal:	Healthy recovery for both mom and baby  Assessment and plan of treatment:	Nothing in the vagina for 6 weeks (no sex, no tampons, no douching). Avoid tub baths, you may shower.  If you have a fever of 100.4F or greater, severe vaginal bleeding, or severe abdominal pain, call your Ob/Gyn or come to the emergency department immediately.  Please follow up with your provider in 6 weeks for postpartum visit.

## 2020-10-02 NOTE — DISCHARGE NOTE OB - PLAN OF CARE
Healthy recovery for both mom and baby Nothing in the vagina for 6 weeks (no sex, no tampons, no douching). Avoid tub baths, you may shower.  If you have a fever of 100.4F or greater, severe vaginal bleeding, or severe abdominal pain, call your Ob/Gyn or come to the emergency department immediately.  Please follow up with your provider in 6 weeks for postpartum visit.

## 2020-10-07 DIAGNOSIS — Z3A.41 41 WEEKS GESTATION OF PREGNANCY: ICD-10-CM

## 2020-10-07 DIAGNOSIS — Z34.93 ENCOUNTER FOR SUPERVISION OF NORMAL PREGNANCY, UNSPECIFIED, THIRD TRIMESTER: ICD-10-CM

## 2020-10-07 DIAGNOSIS — O48.0 POST-TERM PREGNANCY: ICD-10-CM

## 2020-10-20 NOTE — OB RN DELIVERY SUMMARY - NS_PLACENTA_OBGYN_ALL_OB_DT
[FreeTextEntry1] : left ear pain [de-identified] : Ms. LORA FERNANDEZ is a 49 year female with a PMH of Sergio's syndrome HTN, HLD, GERD, Hypothyroidism comes to the office c/o left ear pain, patient was seen at [facility] MD 2 weeks ago given topical antibiotic eardrops. Patient still having pain in left ear. Patient denies fever, cough SOB. No other complaints at this time.  30-Sep-2020 09:57

## 2021-03-19 NOTE — OB PROVIDER TRIAGE NOTE - NSDOBORLOSS4_OBGYN_ALL_OB_DT
Mohs Histo Method Verbiage: Each section was then chromacoded and processed in the Mohs lab using the Mohs protocol and submitted for frozen section. 02-Jun-2016

## 2022-06-27 ENCOUNTER — ASOB RESULT (OUTPATIENT)
Age: 32
End: 2022-06-27

## 2022-06-27 ENCOUNTER — APPOINTMENT (OUTPATIENT)
Dept: ANTEPARTUM | Facility: CLINIC | Age: 32
End: 2022-06-27

## 2022-06-27 VITALS
DIASTOLIC BLOOD PRESSURE: 74 MMHG | SYSTOLIC BLOOD PRESSURE: 112 MMHG | WEIGHT: 170 LBS | HEIGHT: 61 IN | BODY MASS INDEX: 32.1 KG/M2

## 2022-06-27 PROCEDURE — 76811 OB US DETAILED SNGL FETUS: CPT

## 2022-07-14 ENCOUNTER — NON-APPOINTMENT (OUTPATIENT)
Age: 32
End: 2022-07-14

## 2022-07-14 DIAGNOSIS — Z78.9 OTHER SPECIFIED HEALTH STATUS: ICD-10-CM

## 2022-07-14 DIAGNOSIS — Z34.90 ENCOUNTER FOR SUPERVISION OF NORMAL PREGNANCY, UNSPECIFIED, UNSPECIFIED TRIMESTER: ICD-10-CM

## 2022-07-14 DIAGNOSIS — Z98.890 OTHER SPECIFIED POSTPROCEDURAL STATES: ICD-10-CM

## 2022-08-01 ENCOUNTER — APPOINTMENT (OUTPATIENT)
Dept: OBGYN | Facility: CLINIC | Age: 32
End: 2022-08-01

## 2022-08-31 ENCOUNTER — APPOINTMENT (OUTPATIENT)
Dept: OBGYN | Facility: CLINIC | Age: 32
End: 2022-08-31

## 2022-08-31 PROCEDURE — 76816 OB US FOLLOW-UP PER FETUS: CPT

## 2022-08-31 PROCEDURE — 0502F SUBSEQUENT PRENATAL CARE: CPT | Mod: NC

## 2022-09-21 ENCOUNTER — APPOINTMENT (OUTPATIENT)
Dept: OBGYN | Facility: CLINIC | Age: 32
End: 2022-09-21

## 2022-09-21 PROCEDURE — 0502F SUBSEQUENT PRENATAL CARE: CPT | Mod: NC

## 2022-09-21 PROCEDURE — 81003 URINALYSIS AUTO W/O SCOPE: CPT | Mod: QW

## 2022-09-21 PROCEDURE — 36415 COLL VENOUS BLD VENIPUNCTURE: CPT

## 2022-09-22 LAB
BASOPHILS # BLD AUTO: 0.02 K/UL
BASOPHILS NFR BLD AUTO: 0.2 %
EOSINOPHIL # BLD AUTO: 0.09 K/UL
EOSINOPHIL NFR BLD AUTO: 1.1 %
HCT VFR BLD CALC: 35.3 %
HGB BLD-MCNC: 11 G/DL
HIV1+2 AB SPEC QL IA.RAPID: NONREACTIVE
IMM GRANULOCYTES NFR BLD AUTO: 0.6 %
LYMPHOCYTES # BLD AUTO: 1.85 K/UL
LYMPHOCYTES NFR BLD AUTO: 22.9 %
MAN DIFF?: NORMAL
MCHC RBC-ENTMCNC: 30.4 PG
MCHC RBC-ENTMCNC: 31.2 GM/DL
MCV RBC AUTO: 97.5 FL
MONOCYTES # BLD AUTO: 0.83 K/UL
MONOCYTES NFR BLD AUTO: 10.3 %
NEUTROPHILS # BLD AUTO: 5.23 K/UL
NEUTROPHILS NFR BLD AUTO: 64.9 %
PLATELET # BLD AUTO: 195 K/UL
RBC # BLD: 3.62 M/UL
RBC # FLD: 13.9 %
T PALLIDUM AB SER QL IA: NEGATIVE
WBC # FLD AUTO: 8.07 K/UL

## 2022-10-06 ENCOUNTER — APPOINTMENT (OUTPATIENT)
Dept: OBGYN | Facility: CLINIC | Age: 32
End: 2022-10-06

## 2022-10-06 PROCEDURE — 0502F SUBSEQUENT PRENATAL CARE: CPT | Mod: NC

## 2022-10-06 PROCEDURE — 81003 URINALYSIS AUTO W/O SCOPE: CPT | Mod: QW

## 2022-10-19 ENCOUNTER — APPOINTMENT (OUTPATIENT)
Dept: OBGYN | Facility: CLINIC | Age: 32
End: 2022-10-19

## 2022-10-19 VITALS — SYSTOLIC BLOOD PRESSURE: 107 MMHG | WEIGHT: 198 LBS | DIASTOLIC BLOOD PRESSURE: 73 MMHG | BODY MASS INDEX: 37.41 KG/M2

## 2022-10-19 LAB
BILIRUB UR QL STRIP: NORMAL
GLUCOSE UR-MCNC: NORMAL
HCG UR QL: 0.2 EU/DL
HGB UR QL STRIP.AUTO: NORMAL
KETONES UR-MCNC: NORMAL
LEUKOCYTE ESTERASE UR QL STRIP: NORMAL
NITRITE UR QL STRIP: NORMAL
PH UR STRIP: 7
PROT UR STRIP-MCNC: NORMAL
SP GR UR STRIP: 1.02

## 2022-10-19 PROCEDURE — 76816 OB US FOLLOW-UP PER FETUS: CPT

## 2022-10-19 PROCEDURE — 0502F SUBSEQUENT PRENATAL CARE: CPT | Mod: NC

## 2022-10-19 PROCEDURE — 81003 URINALYSIS AUTO W/O SCOPE: CPT | Mod: QW

## 2022-10-28 ENCOUNTER — APPOINTMENT (OUTPATIENT)
Dept: OBGYN | Facility: CLINIC | Age: 32
End: 2022-10-28

## 2022-10-31 ENCOUNTER — APPOINTMENT (OUTPATIENT)
Dept: OBGYN | Facility: CLINIC | Age: 32
End: 2022-10-31

## 2022-10-31 PROCEDURE — 0502F SUBSEQUENT PRENATAL CARE: CPT | Mod: NC

## 2022-10-31 PROCEDURE — 81003 URINALYSIS AUTO W/O SCOPE: CPT | Mod: QW

## 2022-11-02 LAB — BACTERIA UR CULT: NORMAL

## 2022-11-03 ENCOUNTER — APPOINTMENT (OUTPATIENT)
Dept: OBGYN | Facility: CLINIC | Age: 32
End: 2022-11-03

## 2022-11-03 VITALS
BODY MASS INDEX: 37.27 KG/M2 | HEIGHT: 61 IN | WEIGHT: 197.38 LBS | SYSTOLIC BLOOD PRESSURE: 117 MMHG | DIASTOLIC BLOOD PRESSURE: 68 MMHG

## 2022-11-03 PROCEDURE — 0502F SUBSEQUENT PRENATAL CARE: CPT | Mod: NC

## 2022-11-03 PROCEDURE — 76818 FETAL BIOPHYS PROFILE W/NST: CPT

## 2022-11-03 PROCEDURE — 81003 URINALYSIS AUTO W/O SCOPE: CPT | Mod: QW

## 2022-11-07 ENCOUNTER — APPOINTMENT (OUTPATIENT)
Dept: OBGYN | Facility: CLINIC | Age: 32
End: 2022-11-07

## 2022-11-07 PROCEDURE — 81003 URINALYSIS AUTO W/O SCOPE: CPT | Mod: QW

## 2022-11-07 PROCEDURE — 76815 OB US LIMITED FETUS(S): CPT

## 2022-11-07 PROCEDURE — 0502F SUBSEQUENT PRENATAL CARE: CPT | Mod: NC

## 2022-11-07 PROCEDURE — 59025 FETAL NON-STRESS TEST: CPT | Mod: 59

## 2022-11-09 ENCOUNTER — INPATIENT (INPATIENT)
Facility: HOSPITAL | Age: 32
LOS: 1 days | Discharge: HOME | End: 2022-11-11
Attending: OBSTETRICS & GYNECOLOGY | Admitting: OBSTETRICS & GYNECOLOGY

## 2022-11-09 VITALS — SYSTOLIC BLOOD PRESSURE: 119 MMHG | HEART RATE: 90 BPM | DIASTOLIC BLOOD PRESSURE: 58 MMHG

## 2022-11-09 DIAGNOSIS — Z28.310 UNVACCINATED FOR COVID-19: ICD-10-CM

## 2022-11-09 DIAGNOSIS — Z28.09 IMMUNIZATION NOT CARRIED OUT BECAUSE OF OTHER CONTRAINDICATION: ICD-10-CM

## 2022-11-09 DIAGNOSIS — R03.0 ELEVATED BLOOD-PRESSURE READING, WITHOUT DIAGNOSIS OF HYPERTENSION: ICD-10-CM

## 2022-11-09 LAB
BILIRUB UR QL STRIP: NORMAL
GLUCOSE UR-MCNC: NORMAL
HCG UR QL: 0.2 EU/DL
HGB UR QL STRIP.AUTO: NORMAL
KETONES UR-MCNC: 15
LEUKOCYTE ESTERASE UR QL STRIP: NORMAL
NITRITE UR QL STRIP: NORMAL
PH UR STRIP: 7
PROT UR STRIP-MCNC: 100
SP GR UR STRIP: 1.02

## 2022-11-09 RX ORDER — OXYTOCIN 10 UNIT/ML
333.33 VIAL (ML) INJECTION
Qty: 20 | Refills: 0 | Status: DISCONTINUED | OUTPATIENT
Start: 2022-11-09 | End: 2022-11-11

## 2022-11-09 RX ORDER — AMPICILLIN TRIHYDRATE 250 MG
1 CAPSULE ORAL EVERY 4 HOURS
Refills: 0 | Status: DISCONTINUED | OUTPATIENT
Start: 2022-11-09 | End: 2022-11-10

## 2022-11-09 RX ORDER — SODIUM CHLORIDE 9 MG/ML
1000 INJECTION, SOLUTION INTRAVENOUS
Refills: 0 | Status: DISCONTINUED | OUTPATIENT
Start: 2022-11-09 | End: 2022-11-10

## 2022-11-09 RX ORDER — AMPICILLIN TRIHYDRATE 250 MG
2 CAPSULE ORAL ONCE
Refills: 0 | Status: COMPLETED | OUTPATIENT
Start: 2022-11-09 | End: 2022-11-09

## 2022-11-09 RX ORDER — CHLORHEXIDINE GLUCONATE 213 G/1000ML
1 SOLUTION TOPICAL ONCE
Refills: 0 | Status: DISCONTINUED | OUTPATIENT
Start: 2022-11-09 | End: 2022-11-10

## 2022-11-09 NOTE — OB RN PATIENT PROFILE - FALL HARM RISK - UNIVERSAL INTERVENTIONS
Bed in lowest position, wheels locked, appropriate side rails in place/Call bell, personal items and telephone in reach/Instruct patient to call for assistance before getting out of bed or chair/Non-slip footwear when patient is out of bed/Jber to call system/Physically safe environment - no spills, clutter or unnecessary equipment/Purposeful Proactive Rounding/Room/bathroom lighting operational, light cord in reach

## 2022-11-10 LAB
AMPHET UR-MCNC: NEGATIVE — SIGNIFICANT CHANGE UP
APPEARANCE UR: ABNORMAL
BACTERIA # UR AUTO: ABNORMAL
BARBITURATES UR SCN-MCNC: NEGATIVE — SIGNIFICANT CHANGE UP
BASOPHILS # BLD AUTO: 0.03 K/UL — SIGNIFICANT CHANGE UP (ref 0–0.2)
BASOPHILS NFR BLD AUTO: 0.3 % — SIGNIFICANT CHANGE UP (ref 0–1)
BENZODIAZ UR-MCNC: NEGATIVE — SIGNIFICANT CHANGE UP
BILIRUB UR-MCNC: NEGATIVE — SIGNIFICANT CHANGE UP
BLD GP AB SCN SERPL QL: SIGNIFICANT CHANGE UP
BUPRENORPHINE SCREEN, URINE RESULT: NEGATIVE — SIGNIFICANT CHANGE UP
COCAINE METAB.OTHER UR-MCNC: NEGATIVE — SIGNIFICANT CHANGE UP
COD CRY URNS QL: ABNORMAL
COLOR SPEC: SIGNIFICANT CHANGE UP
DIFF PNL FLD: ABNORMAL
EOSINOPHIL # BLD AUTO: 0.1 K/UL — SIGNIFICANT CHANGE UP (ref 0–0.7)
EOSINOPHIL NFR BLD AUTO: 1.2 % — SIGNIFICANT CHANGE UP (ref 0–8)
EPI CELLS # UR: 22 /HPF — HIGH (ref 0–5)
FENTANYL UR QL: NEGATIVE — SIGNIFICANT CHANGE UP
GLUCOSE UR QL: NEGATIVE — SIGNIFICANT CHANGE UP
HCT VFR BLD CALC: 34.8 % — LOW (ref 37–47)
HGB BLD-MCNC: 11.8 G/DL — LOW (ref 12–16)
HYALINE CASTS # UR AUTO: 3 /LPF — SIGNIFICANT CHANGE UP (ref 0–7)
IMM GRANULOCYTES NFR BLD AUTO: 0.8 % — HIGH (ref 0.1–0.3)
KETONES UR-MCNC: NEGATIVE — SIGNIFICANT CHANGE UP
L&D DRUG SCREEN, URINE: SIGNIFICANT CHANGE UP
LEUKOCYTE ESTERASE UR-ACNC: ABNORMAL
LYMPHOCYTES # BLD AUTO: 2.38 K/UL — SIGNIFICANT CHANGE UP (ref 1.2–3.4)
LYMPHOCYTES # BLD AUTO: 27.4 % — SIGNIFICANT CHANGE UP (ref 20.5–51.1)
MCHC RBC-ENTMCNC: 30 PG — SIGNIFICANT CHANGE UP (ref 27–31)
MCHC RBC-ENTMCNC: 33.9 G/DL — SIGNIFICANT CHANGE UP (ref 32–37)
MCV RBC AUTO: 88.5 FL — SIGNIFICANT CHANGE UP (ref 81–99)
METHADONE UR-MCNC: NEGATIVE — SIGNIFICANT CHANGE UP
MONOCYTES # BLD AUTO: 0.93 K/UL — HIGH (ref 0.1–0.6)
MONOCYTES NFR BLD AUTO: 10.7 % — HIGH (ref 1.7–9.3)
NEUTROPHILS # BLD AUTO: 5.17 K/UL — SIGNIFICANT CHANGE UP (ref 1.4–6.5)
NEUTROPHILS NFR BLD AUTO: 59.6 % — SIGNIFICANT CHANGE UP (ref 42.2–75.2)
NITRITE UR-MCNC: NEGATIVE — SIGNIFICANT CHANGE UP
NRBC # BLD: 0 /100 WBCS — SIGNIFICANT CHANGE UP (ref 0–0)
OPIATES UR-MCNC: NEGATIVE — SIGNIFICANT CHANGE UP
OXYCODONE UR-MCNC: NEGATIVE — SIGNIFICANT CHANGE UP
PCP UR-MCNC: NEGATIVE — SIGNIFICANT CHANGE UP
PH UR: 6.5 — SIGNIFICANT CHANGE UP (ref 5–8)
PLATELET # BLD AUTO: 210 K/UL — SIGNIFICANT CHANGE UP (ref 130–400)
PRENATAL SYPHILIS TEST: SIGNIFICANT CHANGE UP
PROPOXYPHENE QUALITATIVE URINE RESULT: NEGATIVE — SIGNIFICANT CHANGE UP
PROT UR-MCNC: SIGNIFICANT CHANGE UP
RBC # BLD: 3.93 M/UL — LOW (ref 4.2–5.4)
RBC # FLD: 14 % — SIGNIFICANT CHANGE UP (ref 11.5–14.5)
RBC CASTS # UR COMP ASSIST: 2 /HPF — SIGNIFICANT CHANGE UP (ref 0–4)
SARS-COV-2 RNA SPEC QL NAA+PROBE: SIGNIFICANT CHANGE UP
SP GR SPEC: 1.01 — SIGNIFICANT CHANGE UP (ref 1.01–1.03)
UROBILINOGEN FLD QL: SIGNIFICANT CHANGE UP
WBC # BLD: 8.68 K/UL — SIGNIFICANT CHANGE UP (ref 4.8–10.8)
WBC # FLD AUTO: 8.68 K/UL — SIGNIFICANT CHANGE UP (ref 4.8–10.8)
WBC UR QL: 12 /HPF — HIGH (ref 0–5)

## 2022-11-10 PROCEDURE — 59400 OBSTETRICAL CARE: CPT | Mod: U9

## 2022-11-10 RX ORDER — TETANUS TOXOID, REDUCED DIPHTHERIA TOXOID AND ACELLULAR PERTUSSIS VACCINE, ADSORBED 5; 2.5; 8; 8; 2.5 [IU]/.5ML; [IU]/.5ML; UG/.5ML; UG/.5ML; UG/.5ML
0.5 SUSPENSION INTRAMUSCULAR ONCE
Refills: 0 | Status: DISCONTINUED | OUTPATIENT
Start: 2022-11-10 | End: 2022-11-11

## 2022-11-10 RX ORDER — OXYCODONE HYDROCHLORIDE 5 MG/1
5 TABLET ORAL
Refills: 0 | Status: DISCONTINUED | OUTPATIENT
Start: 2022-11-10 | End: 2022-11-11

## 2022-11-10 RX ORDER — PRAMOXINE HYDROCHLORIDE 150 MG/15G
1 AEROSOL, FOAM RECTAL EVERY 4 HOURS
Refills: 0 | Status: DISCONTINUED | OUTPATIENT
Start: 2022-11-10 | End: 2022-11-11

## 2022-11-10 RX ORDER — KETOROLAC TROMETHAMINE 30 MG/ML
30 SYRINGE (ML) INJECTION ONCE
Refills: 0 | Status: DISCONTINUED | OUTPATIENT
Start: 2022-11-10 | End: 2022-11-10

## 2022-11-10 RX ORDER — OXYCODONE HYDROCHLORIDE 5 MG/1
5 TABLET ORAL ONCE
Refills: 0 | Status: DISCONTINUED | OUTPATIENT
Start: 2022-11-10 | End: 2022-11-11

## 2022-11-10 RX ORDER — OXYTOCIN 10 UNIT/ML
1 VIAL (ML) INJECTION
Qty: 30 | Refills: 0 | Status: DISCONTINUED | OUTPATIENT
Start: 2022-11-10 | End: 2022-11-10

## 2022-11-10 RX ORDER — OXYTOCIN 10 UNIT/ML
333.33 VIAL (ML) INJECTION
Qty: 20 | Refills: 0 | Status: DISCONTINUED | OUTPATIENT
Start: 2022-11-10 | End: 2022-11-11

## 2022-11-10 RX ORDER — DIPHENHYDRAMINE HCL 50 MG
25 CAPSULE ORAL EVERY 6 HOURS
Refills: 0 | Status: DISCONTINUED | OUTPATIENT
Start: 2022-11-10 | End: 2022-11-11

## 2022-11-10 RX ORDER — DIBUCAINE 1 %
1 OINTMENT (GRAM) RECTAL EVERY 6 HOURS
Refills: 0 | Status: DISCONTINUED | OUTPATIENT
Start: 2022-11-10 | End: 2022-11-11

## 2022-11-10 RX ORDER — IBUPROFEN 200 MG
600 TABLET ORAL EVERY 6 HOURS
Refills: 0 | Status: COMPLETED | OUTPATIENT
Start: 2022-11-10 | End: 2023-10-09

## 2022-11-10 RX ORDER — HYDROCORTISONE 1 %
1 OINTMENT (GRAM) TOPICAL EVERY 6 HOURS
Refills: 0 | Status: DISCONTINUED | OUTPATIENT
Start: 2022-11-10 | End: 2022-11-11

## 2022-11-10 RX ORDER — ACETAMINOPHEN 500 MG
975 TABLET ORAL
Refills: 0 | Status: DISCONTINUED | OUTPATIENT
Start: 2022-11-10 | End: 2022-11-11

## 2022-11-10 RX ORDER — MAGNESIUM HYDROXIDE 400 MG/1
30 TABLET, CHEWABLE ORAL
Refills: 0 | Status: DISCONTINUED | OUTPATIENT
Start: 2022-11-10 | End: 2022-11-11

## 2022-11-10 RX ORDER — SIMETHICONE 80 MG/1
80 TABLET, CHEWABLE ORAL EVERY 4 HOURS
Refills: 0 | Status: DISCONTINUED | OUTPATIENT
Start: 2022-11-10 | End: 2022-11-11

## 2022-11-10 RX ORDER — SODIUM CHLORIDE 9 MG/ML
3 INJECTION INTRAMUSCULAR; INTRAVENOUS; SUBCUTANEOUS EVERY 8 HOURS
Refills: 0 | Status: DISCONTINUED | OUTPATIENT
Start: 2022-11-10 | End: 2022-11-11

## 2022-11-10 RX ORDER — LANOLIN
1 OINTMENT (GRAM) TOPICAL EVERY 6 HOURS
Refills: 0 | Status: DISCONTINUED | OUTPATIENT
Start: 2022-11-10 | End: 2022-11-11

## 2022-11-10 RX ORDER — IBUPROFEN 200 MG
600 TABLET ORAL EVERY 6 HOURS
Refills: 0 | Status: DISCONTINUED | OUTPATIENT
Start: 2022-11-10 | End: 2022-11-11

## 2022-11-10 RX ORDER — AER TRAVELER 0.5 G/1
1 SOLUTION RECTAL; TOPICAL EVERY 4 HOURS
Refills: 0 | Status: DISCONTINUED | OUTPATIENT
Start: 2022-11-10 | End: 2022-11-11

## 2022-11-10 RX ORDER — INFLUENZA VIRUS VACCINE 15; 15; 15; 15 UG/.5ML; UG/.5ML; UG/.5ML; UG/.5ML
0.5 SUSPENSION INTRAMUSCULAR ONCE
Refills: 0 | Status: COMPLETED | OUTPATIENT
Start: 2022-11-10 | End: 2022-11-10

## 2022-11-10 RX ORDER — BENZOCAINE 10 %
1 GEL (GRAM) MUCOUS MEMBRANE EVERY 6 HOURS
Refills: 0 | Status: DISCONTINUED | OUTPATIENT
Start: 2022-11-10 | End: 2022-11-11

## 2022-11-10 RX ADMIN — SODIUM CHLORIDE 125 MILLILITER(S): 9 INJECTION, SOLUTION INTRAVENOUS at 00:27

## 2022-11-10 RX ADMIN — Medication 108 GRAM(S): at 04:27

## 2022-11-10 RX ADMIN — Medication 200 GRAM(S): at 00:27

## 2022-11-10 RX ADMIN — Medication 1 SPRAY(S): at 10:49

## 2022-11-10 RX ADMIN — Medication 1000 MILLIUNIT(S)/MIN: at 09:55

## 2022-11-10 RX ADMIN — SODIUM CHLORIDE 3 MILLILITER(S): 9 INJECTION INTRAMUSCULAR; INTRAVENOUS; SUBCUTANEOUS at 15:06

## 2022-11-10 RX ADMIN — Medication 975 MILLIGRAM(S): at 12:24

## 2022-11-10 RX ADMIN — Medication 1 APPLICATION(S): at 10:49

## 2022-11-10 RX ADMIN — Medication 975 MILLIGRAM(S): at 13:01

## 2022-11-10 RX ADMIN — SODIUM CHLORIDE 3 MILLILITER(S): 9 INJECTION INTRAMUSCULAR; INTRAVENOUS; SUBCUTANEOUS at 21:29

## 2022-11-10 RX ADMIN — SODIUM CHLORIDE 125 MILLILITER(S): 9 INJECTION, SOLUTION INTRAVENOUS at 04:19

## 2022-11-10 RX ADMIN — Medication 1 MILLIUNIT(S)/MIN: at 00:55

## 2022-11-10 RX ADMIN — Medication 1 TABLET(S): at 12:24

## 2022-11-10 RX ADMIN — Medication 600 MILLIGRAM(S): at 21:29

## 2022-11-10 RX ADMIN — Medication 600 MILLIGRAM(S): at 20:53

## 2022-11-10 NOTE — PROCEDURE NOTE - NSSITEPREP_SKIN_A_CORE
OP Note  Procedure Date: 2/18/2021     PreOp Diagnosis: Screening colonoscopy    PostOp Diagnosis:   Colon:  - A 4 mm sessile polyp was seen in the ascending colon, s/p cold snare polypectomy, completely removed and retrieved  - A 12 mm semi-pedunculated polyp was seen in the ascending colon, s/p cold snare polypectomy, completely removed, not retrieved  - Two 3 mm sessile polyp was seen in the rectum, s/p cold forceps biopsy polypectomy, completely removed and retrieved  - Small internal hemorrhoids    Procedure(s):  COLONOSCOPY - Wound Class: Clean Contaminated    Surgeon(s):  Carola Lobato M.D.    Anesthesiologist/Type of Anesthesia:  Anesthesiologist: Jordin Horner M.D./General    Surgical Staff:  Endoscopy Technician: Meena Alexis  Endoscopy Nurse: Anat Rodriguez; Carlos Yusuf R.N.    Specimens removed if any:  ID Type Source Tests Collected by Time Destination   A : X2 Polyp Colon - Ascending PATHOLOGY SPECIMEN Carola Lobato M.D. 2/18/2021  7:52 AM    B : X2 Polyp Rectal PATHOLOGY SPECIMEN Carola Lobato M.D. 2/18/2021  8:07 AM        Estimated Blood Loss: Minimal    Anesthesia/Medications:  see anesthesia note     COMPLICATIONS:  No immediate complications.    PROCEDURE IN DETAIL, Findings and ENDOSCOPIC DIAGNOSIS:      -Prior to the procedure, a History and Physical was performed, and patient medications and allergies were reviewed. The patient’s tolerance of previous anesthesia was also reviewed. The risks and benefits of the procedure and the sedation options and risks were discussed with the patient. All questions were answered, and informed consent was obtained. The patient was deemed in satisfactory condition to undergo the procedure.    -Prior Anticoagulants: the patient has taken no previous anticoagulant or antiplatelet agents.    -ASA Grade Assessment: see anesthesia note     -The patient was placed in the left lateral decubitus position. The scope was passed under direct vision.  Continuous oxygen was provided via nasal cannula and intravenous sedation was administered in divided doses throughout the procedure. The patient’s blood pressure, pulse, and oxygen saturation were monitored continuously throughout the procedure.    -The colonoscope was gently introduced through the anus and advanced to the cecum, identified by appendiceal orifice & ileocecal valve. The scope was then fully withdrawn while examining the color, texture, anatomy and integrity of the mucosa from the cecum to the anal canal. The scope was completely retrieved upon exiting the anal canal and the procedure was terminated. The colonoscopy was performed with some difficulty due to redundant colon, requiring 2-person pressure. The patient tolerated the procedure well. The quality of the bowel preparation was faior. Further details are in the finding paragraph, based on anatomical location.    -The patient tolerated the procedure well and there were no immediate complications. The patient was then transferred to the recovery room in stable condition.    Findings:  Colon:  - A 4 mm sessile polyp was seen in the ascending colon, s/p cold snare polypectomy, completely removed and retrieved  - A 12 mm semi-pedunculated polyp was seen in the ascending colon, s/p cold snare polypectomy, completely removed, not retrieved  - Two 3 mm sessile polyp was seen in the rectum, s/p cold forceps biopsy polypectomy, completely removed and retrieved  - Small internal hemorrhoids    RECOMMENDATIONS:    1. A letter will be sent regarding to the biopsy result in about 2 weeks.  2. Recall screening colonoscopy in 3 years, with 2-day prep  3. Ok to discharge once criteria met        2/18/2021 8:22 AM Carola Lobato M.D.     chlorhexidine

## 2022-11-10 NOTE — OB RN DELIVERY SUMMARY - NSSELHIDDEN_OBGYN_ALL_OB_FT
[NS_DeliveryAttending1_OBGYN_ALL_OB_FT:[email or web address]=] [NS_DeliveryAttending1_OBGYN_ALL_OB_FT:[email or web address]=],[NS_DeliveryRN_OBGYN_ALL_OB_FT:[id number]=]

## 2022-11-10 NOTE — OB PROVIDER H&P - NSHPPHYSICALEXAM_GEN_ALL_CORE
Vital Signs Last 24 Hrs  T(C): 37 (09 Nov 2022 23:49), Max: 37 (09 Nov 2022 23:49)  T(F): 98.6 (09 Nov 2022 23:49), Max: 98.6 (09 Nov 2022 23:49)  HR: 82 (10 Nov 2022 01:15) (82 - 93)  BP: 106/58 (10 Nov 2022 01:15) (106/58 - 119/58)  RR: 18 (09 Nov 2022 23:49) (18 - 18)    Physical Exam  Gen: AAOx3, NAD  Abd: soft, gravid, nontender, nondistended, no palpable contractions  Ext: no edema or erythema in bilateral upper and lower extremities  SVE: 4/50/-3, vertex, intact  EFW by [place]: 3500    EFM: 130 bpm/moderate variability/+acecls  [place]: irregular

## 2022-11-10 NOTE — PROGRESS NOTE ADULT - SUBJECTIVE AND OBJECTIVE BOX
PGY1 Note    Patient seen at bedside due to loss of contact and inability to locate the FHR after the epidural. Patient was AROM, clear and FSE was placed. Pit was turned off.    Vital Signs Last 24 Hrs  T(C): 37 (10 Nov 2022 03:12), Max: 37 (2022 23:49)  T(F): 98.6 (2022 23:49), Max: 98.6 (2022 23:49)  HR: 82 (10 Nov 2022 03:51) (46 - 94)  BP: 98/51 (10 Nov 2022 03:46) (84/46 - 143/68) - isolated elevated BP on admission, hypotensive s/p epidural placement, recovering  RR: 18 (10 Nov 2022 03:12) (18 - 18)  SpO2: 100% (10 Nov 2022 03:46) (98% - 100%)    EFM: 130bpm/moderate variability/variable decels  TOCO: q2min  SVE: 5/80/-1, AROM clear    Medications:  amp: first dose given   pit 1x1: started 0055, stopped 0220  epi: placed 0205  FSE: placed 0229    Labs:                        11.8   8.68  )-----------( 210      ( 2022 23:50 )             34.8       Prenatal Syphilis Test: Nonreact ( @ 23:50)  Antibody Screen: NEG (22 @ 23:50)    Urinalysis Basic - ( 2022 23:50 )    Color: Light Yellow / Appearance: Slightly Turbid / S.012 / pH: x  Gluc: x / Ketone: Negative  / Bili: Negative / Urobili: <2 mg/dL   Blood: x / Protein: Trace / Nitrite: Negative   Leuk Esterase: Moderate / RBC: 2 /HPF / WBC 12 /HPF   Sq Epi: x / Non Sq Epi: 22 /HPF / Bacteria: Moderate

## 2022-11-10 NOTE — OB PROVIDER H&P - HISTORY OF PRESENT ILLNESS
33yo  at 41w1d GA (CARLOS: 10/30/22, by early sono) presents to labor and delivery for scheduled IOL for post dates. Patient denies contractions, vaginal bleeding, and leakage of fluids. Endorses good fetal movement. GBS pos. 33yo  at 41w3d GA (CARLOS: 10/30/22, by early sono) presents to labor and delivery for scheduled IOL for post dates. Patient denies contractions, vaginal bleeding, and leakage of fluids. Endorses good fetal movement. GBS pos.

## 2022-11-10 NOTE — CHART NOTE - NSCHARTNOTEFT_GEN_A_CORE
PGY 2 Note    Patient seen at bedside for prolonged deceleration to ken of 60 bpm with intermittent recovery to baseline throughout the 12 min episode.   SVE 8/100/0.    Will continue to monitor.    Dr. Cool at bedside.

## 2022-11-10 NOTE — PROCEDURE NOTE - ADDITIONAL PROCEDURE DETAILS
0724 Post Labor  Epidural/ Delivery  Evaluation Note:    Uncomplicated anesthetic for Vaginal Delivery.    Patient seen at bedside. Epidural to be removed by RN before patient transfer.  Patient moving B/L lower extremities.  Motor block appropriate and resolving. Vital Signs are stable. Pain well controlled.     Nader Score greater than 9    Mental Status:  __x__ Awake   ___x__ Alert   _____ Drowsy   _____ Sedated    Nausea/Vomiting:  __x__ NO  ______Yes,   See Post - Op Orders          Pain Scale (0-10):  _____    Treatment: __X__ None       Plan: Discharge:   ____Home       __X___Floor     _____Critical Care    _____

## 2022-11-10 NOTE — OB PROVIDER H&P - ASSESSMENT
A/P: 31yo  at 41w1d GA ,GBS pos, IOL for post dates    -admit to labor and delivery  -pain management prn   -continous efm & toco  -admission labs  -IV access   -IV hydration   -diet: clear liquid diet   -GBS ppx: ampicillin  -pit 1x1    Dr. Araujo and Dr. Cool aware A/P: 31yo  at 41w3d GA ,GBS pos, IOL for post dates    -admit to labor and delivery  -pain management prn   -continous efm & toco  -admission labs  -IV access   -IV hydration   -diet: clear liquid diet   -GBS ppx: ampicillin  -pit 1x1    Dr. Araujo and Dr. Cool aware

## 2022-11-10 NOTE — OB PROVIDER H&P - NSHPLABSRESULTS_GEN_ALL_CORE
5/31/22  UCx GBS pos    5/4/22  parvo IgG pos, IgM neg  measles immune  mumps nonimmune  rubella immune  varicella immune  lead <1  chlamydia neg  GC neg  RPR NR  O pos, no antibodies detected  HIV NR  HepBSAg NR    US  22w1d: normal anatomy scan, EFW 450gm, vertex, posterior placenta (no previa), cervix 4.73cm

## 2022-11-10 NOTE — OB PROVIDER DELIVERY SUMMARY - NSSELHIDDEN_OBGYN_ALL_OB_FT
[NS_DeliveryAttending1_OBGYN_ALL_OB_FT:[email or web address]=],[NS_DeliveryRN_OBGYN_ALL_OB_FT:[id number]=]

## 2022-11-10 NOTE — OB PROVIDER DELIVERY SUMMARY - NSPROVIDERDELIVERYNOTE_OBGYN_ALL_OB_FT
Pt delivered a viable female infant over 1st degree laceration   placenta delivered intact and spont    pt stable mild lochia   uterus firm   laceration repaired with 3-0 chromic suture    baby to reg nursery   pt stable .

## 2022-11-11 ENCOUNTER — TRANSCRIPTION ENCOUNTER (OUTPATIENT)
Age: 32
End: 2022-11-11

## 2022-11-11 VITALS
TEMPERATURE: 96 F | SYSTOLIC BLOOD PRESSURE: 113 MMHG | HEART RATE: 77 BPM | DIASTOLIC BLOOD PRESSURE: 69 MMHG | RESPIRATION RATE: 18 BRPM

## 2022-11-11 LAB
BASOPHILS # BLD AUTO: 0.02 K/UL — SIGNIFICANT CHANGE UP (ref 0–0.2)
BASOPHILS NFR BLD AUTO: 0.2 % — SIGNIFICANT CHANGE UP (ref 0–1)
EOSINOPHIL # BLD AUTO: 0.09 K/UL — SIGNIFICANT CHANGE UP (ref 0–0.7)
EOSINOPHIL NFR BLD AUTO: 0.9 % — SIGNIFICANT CHANGE UP (ref 0–8)
HCT VFR BLD CALC: 30.2 % — LOW (ref 37–47)
HGB BLD-MCNC: 10.2 G/DL — LOW (ref 12–16)
IMM GRANULOCYTES NFR BLD AUTO: 0.6 % — HIGH (ref 0.1–0.3)
LYMPHOCYTES # BLD AUTO: 2.79 K/UL — SIGNIFICANT CHANGE UP (ref 1.2–3.4)
LYMPHOCYTES # BLD AUTO: 28.9 % — SIGNIFICANT CHANGE UP (ref 20.5–51.1)
MCHC RBC-ENTMCNC: 30 PG — SIGNIFICANT CHANGE UP (ref 27–31)
MCHC RBC-ENTMCNC: 33.8 G/DL — SIGNIFICANT CHANGE UP (ref 32–37)
MCV RBC AUTO: 88.8 FL — SIGNIFICANT CHANGE UP (ref 81–99)
MONOCYTES # BLD AUTO: 1.02 K/UL — HIGH (ref 0.1–0.6)
MONOCYTES NFR BLD AUTO: 10.5 % — HIGH (ref 1.7–9.3)
NEUTROPHILS # BLD AUTO: 5.69 K/UL — SIGNIFICANT CHANGE UP (ref 1.4–6.5)
NEUTROPHILS NFR BLD AUTO: 58.9 % — SIGNIFICANT CHANGE UP (ref 42.2–75.2)
NRBC # BLD: 0 /100 WBCS — SIGNIFICANT CHANGE UP (ref 0–0)
PLATELET # BLD AUTO: 168 K/UL — SIGNIFICANT CHANGE UP (ref 130–400)
RBC # BLD: 3.4 M/UL — LOW (ref 4.2–5.4)
RBC # FLD: 14.4 % — SIGNIFICANT CHANGE UP (ref 11.5–14.5)
WBC # BLD: 9.67 K/UL — SIGNIFICANT CHANGE UP (ref 4.8–10.8)
WBC # FLD AUTO: 9.67 K/UL — SIGNIFICANT CHANGE UP (ref 4.8–10.8)

## 2022-11-11 RX ORDER — IBUPROFEN 200 MG
1 TABLET ORAL
Qty: 0 | Refills: 0 | DISCHARGE
Start: 2022-11-11

## 2022-11-11 RX ORDER — ACETAMINOPHEN 500 MG
3 TABLET ORAL
Qty: 0 | Refills: 0 | DISCHARGE
Start: 2022-11-11

## 2022-11-11 RX ADMIN — SODIUM CHLORIDE 3 MILLILITER(S): 9 INJECTION INTRAMUSCULAR; INTRAVENOUS; SUBCUTANEOUS at 13:19

## 2022-11-11 RX ADMIN — Medication 975 MILLIGRAM(S): at 08:35

## 2022-11-11 RX ADMIN — Medication 600 MILLIGRAM(S): at 11:47

## 2022-11-11 RX ADMIN — Medication 975 MILLIGRAM(S): at 08:05

## 2022-11-11 RX ADMIN — SODIUM CHLORIDE 3 MILLILITER(S): 9 INJECTION INTRAMUSCULAR; INTRAVENOUS; SUBCUTANEOUS at 06:49

## 2022-11-11 RX ADMIN — Medication 600 MILLIGRAM(S): at 12:17

## 2022-11-11 RX ADMIN — Medication 1 TABLET(S): at 11:46

## 2022-11-11 NOTE — PROGRESS NOTE ADULT - SUBJECTIVE AND OBJECTIVE BOX
Pt doing well, pain well controlled. Denies heavy vaginal bleeding. No overnight events, no acute complaints. Ambulating without difficulty, voiding, and tolerating regular diet. Breastfeeding.    PAST MEDICAL & SURGICAL HISTORY:  No pertinent past medical history      No significant past surgical history          Physical Exam  Vital Signs Last 24 Hrs  T(F): 96.3 (11 Nov 2022 07:40), Max: 99.4 (10 Nov 2022 10:06)  HR: 77 (11 Nov 2022 07:40) (68 - 113)  BP: 113/69 (11 Nov 2022 07:40) (86/47 - 113/69)  RR: 18 (11 Nov 2022 07:40) (18 - 18)    Gen: AAOx3, NAD  Abd: Soft, nontender, nondistended  Fundus: Firm, nontender, below the umbilicus  Lochia: Minimal  Ext: No calf tenderness, no swelling    Labs:                        10.2   9.67  )-----------( 168      ( 11 Nov 2022 02:50 )             30.2                         11.8   8.68  )-----------( 210      ( 09 Nov 2022 23:50 )             34.8

## 2022-11-11 NOTE — DISCHARGE NOTE OB - NS MD DC FALL RISK RISK
For information on Fall & Injury Prevention, visit: [email or web address] OR  [email or web address] OR  https://www.cdc.gov/steadi/patient.html

## 2022-11-11 NOTE — DISCHARGE NOTE OB - CARE PLAN
1 Principal Discharge DX:	Vaginal delivery  Assessment and plan of treatment:	No heavy lifting. Nothing inside the vagina for 6 weeks: no tampons, douching, sexual intercourse, tub baths or pools. If you have a fever over 100.4F, severe abdominal pain or heavy vaginal bleeding please call your doctor or go to the emergency room.

## 2022-11-11 NOTE — DISCHARGE NOTE OB - CARE PROVIDER_API CALL
Merari Carrillo)  Obstetrics and Gynecology  [address] [place] [address] [id number]  Phone: [phone]  Fax: [phone]  Follow Up Time:

## 2022-11-11 NOTE — PROGRESS NOTE ADULT - ASSESSMENT
PPD #1 s/p  pt doing well, wants discharge home today
A/P: 32y  at 41w4d GA, GBS pos, s/p epidural, in labor    -Continue current management   -Pain management prn  -Continuous EFM/toco  -F/u pending UDS  -diet: CLD  -IVF hydration     Dr. Araujo and Dr. Cool at bedside

## 2022-11-11 NOTE — DISCHARGE NOTE OB - PATIENT PORTAL LINK FT
You can access the FollowMyHealth Patient Portal offered by [facility] by registering at the following website: [email or web address]. By joining [facility]’s FollowMyHealth portal, you will also be able to view your health information using other applications (apps) compatible with our system.

## 2022-11-15 DIAGNOSIS — Z3A.41 41 WEEKS GESTATION OF PREGNANCY: ICD-10-CM

## 2022-11-15 DIAGNOSIS — O48.0 POST-TERM PREGNANCY: ICD-10-CM

## 2022-11-15 NOTE — OB PROVIDER H&P - NSWEIGHT5_OBGYN_ALL_OB_NU
Quality 111:Pneumonia Vaccination Status For Older Adults: Pneumococcal vaccine (PPSV23) administered on or after patient’s 60th birthday and before the end of the measurement period
Detail Level: Detailed
Quality 130: Documentation Of Current Medications In The Medical Record: Current Medications Documented
[id number]

## 2022-12-15 ENCOUNTER — APPOINTMENT (OUTPATIENT)
Dept: OBGYN | Facility: CLINIC | Age: 32
End: 2022-12-15
Payer: MEDICAID

## 2022-12-15 VITALS
HEIGHT: 61 IN | WEIGHT: 185 LBS | DIASTOLIC BLOOD PRESSURE: 68 MMHG | SYSTOLIC BLOOD PRESSURE: 105 MMHG | BODY MASS INDEX: 34.93 KG/M2

## 2022-12-15 DIAGNOSIS — Z00.00 ENCOUNTER FOR GENERAL ADULT MEDICAL EXAMINATION W/OUT ABNORMAL FINDINGS: ICD-10-CM

## 2022-12-15 PROCEDURE — 0503F POSTPARTUM CARE VISIT: CPT

## 2022-12-16 LAB
C TRACH RRNA SPEC QL NAA+PROBE: NOT DETECTED
N GONORRHOEA RRNA SPEC QL NAA+PROBE: NOT DETECTED
SOURCE AMPLIFICATION: NORMAL

## 2022-12-20 DIAGNOSIS — Z78.9 OTHER SPECIFIED HEALTH STATUS: ICD-10-CM

## 2022-12-20 RX ORDER — NORETHINDRONE 0.35 MG/1
0.35 TABLET ORAL DAILY
Qty: 1 | Refills: 11 | Status: ACTIVE | COMMUNITY
Start: 2022-12-20 | End: 1900-01-01

## 2022-12-20 NOTE — HISTORY OF PRESENT ILLNESS
[Postpartum Follow Up] : postpartum follow up [Complications:___] : no complications [] : delivered by vaginal delivery [Breastfeeding] : currently nursing [Back to Normal] : is back to normal in size [Normal] : the vagina was normal [Cervix Sample Taken] : cervical sample not taken for a Pap smear [Examination Of The Breasts] : breasts are normal [Doing Well] : is doing well [No Sign of Infection] : is showing no signs of infection [Excellent Pain Control] : has excellent pain control [None] : None

## 2023-10-04 NOTE — OB RN PATIENT PROFILE - TEMPERATURE IN CELSIUS (DEGREES C)
Frail, thin, under nourished, in no acute distress , ambulating with use of a  walker, normal communication ability 36.8

## 2023-10-16 ENCOUNTER — RX RENEWAL (OUTPATIENT)
Age: 33
End: 2023-10-16

## 2023-11-06 RX ORDER — NORETHINDRONE 0.35 MG/1
0.35 TABLET ORAL DAILY
Qty: 1 | Refills: 1 | Status: ACTIVE | COMMUNITY
Start: 2023-11-06 | End: 1900-01-01

## 2023-11-08 ENCOUNTER — RX RENEWAL (OUTPATIENT)
Age: 33
End: 2023-11-08

## 2023-11-08 RX ORDER — NORETHINDRONE 0.35 MG/1
0.35 TABLET ORAL DAILY
Qty: 1 | Refills: 0 | Status: ACTIVE | COMMUNITY
Start: 2023-11-08 | End: 1900-01-01

## 2023-12-08 NOTE — OB RN DELIVERY SUMMARY - NS_DELBLDTRANSFUS_OBGYN_ALL_OB
Patient BIBA after standing up from a desk this afternoon and having sudden onset upper abdominal pain above the belly button and across both sides. 7/10 pressure pain no nausea, was here recently on Tuesday for nausea and diarrhea due to flu sent home with zofran took one this morning.     Triage Assessment (Adult)       Row Name 12/07/23 1842          Triage Assessment    Airway WDL WDL        Respiratory WDL    Respiratory WDL WDL        Skin Circulation/Temperature WDL    Skin Circulation/Temperature WDL WDL        Cardiac WDL    Cardiac WDL WDL        Peripheral/Neurovascular WDL    Peripheral Neurovascular WDL WDL        Cognitive/Neuro/Behavioral WDL    Cognitive/Neuro/Behavioral WDL WDL                      No

## 2023-12-26 ENCOUNTER — APPOINTMENT (OUTPATIENT)
Dept: OBGYN | Facility: CLINIC | Age: 33
End: 2023-12-26
Payer: MEDICAID

## 2023-12-26 VITALS
DIASTOLIC BLOOD PRESSURE: 66 MMHG | SYSTOLIC BLOOD PRESSURE: 98 MMHG | HEIGHT: 61 IN | WEIGHT: 172 LBS | BODY MASS INDEX: 32.47 KG/M2

## 2023-12-26 DIAGNOSIS — Z01.419 ENCOUNTER FOR GYNECOLOGICAL EXAMINATION (GENERAL) (ROUTINE) W/OUT ABNORMAL FINDINGS: ICD-10-CM

## 2023-12-26 LAB
BILIRUB UR QL STRIP: NORMAL
GLUCOSE UR-MCNC: NORMAL
HCG UR QL: 0.2 EU/DL
HCG UR QL: NEGATIVE
HGB UR QL STRIP.AUTO: NORMAL
KETONES UR-MCNC: NORMAL
LEUKOCYTE ESTERASE UR QL STRIP: NORMAL
NITRITE UR QL STRIP: NORMAL
PH UR STRIP: 6
PROT UR STRIP-MCNC: NORMAL
SP GR UR STRIP: 1.01

## 2023-12-26 PROCEDURE — 99395 PREV VISIT EST AGE 18-39: CPT

## 2023-12-26 PROCEDURE — 81003 URINALYSIS AUTO W/O SCOPE: CPT | Mod: QW

## 2023-12-26 PROCEDURE — 81025 URINE PREGNANCY TEST: CPT

## 2023-12-26 RX ORDER — NORETHINDRONE 0.35 MG/1
0.35 TABLET ORAL
Qty: 1 | Refills: 10 | Status: ACTIVE | COMMUNITY
Start: 2023-12-26 | End: 1900-01-01

## 2023-12-28 LAB
C TRACH RRNA SPEC QL NAA+PROBE: NOT DETECTED
HPV HIGH+LOW RISK DNA PNL CVX: NOT DETECTED
N GONORRHOEA RRNA SPEC QL NAA+PROBE: NOT DETECTED
SOURCE TP AMPLIFICATION: NORMAL

## 2023-12-28 NOTE — HISTORY OF PRESENT ILLNESS
[FreeTextEntry1] : Pt is  a  , no period nursing her 13 mo old.  Micronor for BC   [Patient reported PAP Smear was normal] : Patient reported PAP Smear was normal [PapSmeardate] : 2020

## 2023-12-28 NOTE — PLAN
[FreeTextEntry1] : Normal Exam  -Pap done -Breast self exam reviewed  -Gc Ct sent  -Will refill her BC rx -return visit PRN or 1 year

## 2024-01-09 LAB — CYTOLOGY CVX/VAG DOC THIN PREP: NORMAL

## 2024-02-06 RX ORDER — NORGESTREL AND ETHINYL ESTRADIOL 0.3-0.03MG
0.3-3 KIT ORAL DAILY
Qty: 3 | Refills: 1 | Status: ACTIVE | COMMUNITY
Start: 2024-02-06 | End: 1900-01-01

## 2024-06-18 ENCOUNTER — RX RENEWAL (OUTPATIENT)
Age: 34
End: 2024-06-18

## 2024-06-18 RX ORDER — NORGESTREL AND ETHINYL ESTRADIOL 0.3-0.03MG
0.3-3 KIT ORAL DAILY
Qty: 84 | Refills: 1 | Status: ACTIVE | COMMUNITY
Start: 2024-06-18 | End: 1900-01-01

## 2024-08-09 NOTE — DISCHARGE NOTE OB - MEDICATION SUMMARY - MEDICATIONS TO TAKE
" [facility]  Psychotherapy Summary Note    Full therapy note has been documented and is under restricted viewing.  Please see below for summary of today's session.     Date of Service: 08/09/2024  Appointment type: in-office appointment.  Attending:  Laurence Mercado M.D.     Type of session:Individual psychotherapy  Session start time: 3pm  Session stop time: 3:50pm  Length of time spent face to face with patient: 50min  Persons in attendance: Patient    SI reported: no  HI reported: no    SUMMARY  Shae Carrasco is a 48 y.o. old female who presents today for regularly scheduled psychotherapy for No chief complaint on file.    Homework:   - Journal catastrophic thought vs counter thought  - Self care  - Self-praise   - Evaluating my unhelpful thoughts CBT worksheet    We discussed some coping skills such as: gratitude journaling, self-pampering. I also taught her communication skills such as: Using \"sandwich\" method when discussing confrontations.       Symptoms currently being addressed in therapy: anxiety, behavioral dysregulation, interpersonal difficulty, and Trauma    Therapeutic Intervention(s): Cognitive behavioral therapy, Communication skills, Conflict clarification, Conflict resolution skills, Limit-setting, Positive behavior reinforced, Reality-testing, Relaxation exercise, Self-care skills, and Stressors assessed    CURRENT RISK ASSESSMENT       Suicide: Low       Homicide: Low       Self-Harm: Low       Relapse: Low       Crisis Safety Plan Reviewed Not Indicated    DIAGNOSES/PLAN  Problem List Items Addressed This Visit    None        Treatment Goal(s)/Objective(s) addressed: Tx plan:  Utilize learned skills to manage mood and emotional suffering more effectively  Learn to successfully challenge & change distortions in thinking  Learn to ameliorate effects of anxiety on life and functioning  Increase behaviors of self-compassion and self-care       Progress toward Treatment Goals: " Moderate improvement     Plan:  - Continue individual therapy    Adam Kaye M.D.     I will START or STAY ON the medications listed below when I get home from the hospital:    acetaminophen 325 mg oral tablet  -- 3 tab(s) by mouth every 6 hours  -- Indication: For postpartum care    ibuprofen 600 mg oral tablet  -- 1 tab(s) by mouth every 6 hours  -- Indication: For postpartum care

## 2024-12-20 NOTE — OB RN PATIENT PROFILE - PRESSURE ULCER(S)
Wound Volume (cm^3) 0 cm^3   Wound Healing % 100   Wound Assessment Epithelialization   Pain Assessment   Pain Assessment None - Denies Pain        no

## 2025-02-03 ENCOUNTER — APPOINTMENT (OUTPATIENT)
Dept: OBGYN | Facility: CLINIC | Age: 35
End: 2025-02-03

## 2025-02-03 ENCOUNTER — NON-APPOINTMENT (OUTPATIENT)
Age: 35
End: 2025-02-03

## 2025-02-03 VITALS
SYSTOLIC BLOOD PRESSURE: 110 MMHG | DIASTOLIC BLOOD PRESSURE: 66 MMHG | HEART RATE: 73 BPM | WEIGHT: 169 LBS | BODY MASS INDEX: 31.93 KG/M2

## 2025-02-03 DIAGNOSIS — Z34.90 ENCOUNTER FOR SUPERVISION OF NORMAL PREGNANCY, UNSPECIFIED, UNSPECIFIED TRIMESTER: ICD-10-CM

## 2025-02-03 DIAGNOSIS — Z01.419 ENCOUNTER FOR GYNECOLOGICAL EXAMINATION (GENERAL) (ROUTINE) W/OUT ABNORMAL FINDINGS: ICD-10-CM

## 2025-02-03 LAB
BILIRUB UR QL STRIP: NORMAL
GLUCOSE UR-MCNC: NORMAL
HCG UR QL: 0.2 EU/DL
HCG UR QL: POSITIVE
HGB UR QL STRIP.AUTO: NORMAL
KETONES UR-MCNC: NORMAL
LEUKOCYTE ESTERASE UR QL STRIP: NORMAL
NITRITE UR QL STRIP: NORMAL
PH UR STRIP: 6
PROT UR STRIP-MCNC: 30
SP GR UR STRIP: 1.03

## 2025-02-03 PROCEDURE — 36415 COLL VENOUS BLD VENIPUNCTURE: CPT

## 2025-02-03 PROCEDURE — 99213 OFFICE O/P EST LOW 20 MIN: CPT | Mod: 25

## 2025-02-03 PROCEDURE — 81003 URINALYSIS AUTO W/O SCOPE: CPT | Mod: QW

## 2025-02-03 PROCEDURE — 76815 OB US LIMITED FETUS(S): CPT

## 2025-02-03 PROCEDURE — 81025 URINE PREGNANCY TEST: CPT

## 2025-02-03 PROCEDURE — 99459 PELVIC EXAMINATION: CPT

## 2025-02-03 PROCEDURE — 99395 PREV VISIT EST AGE 18-39: CPT

## 2025-02-04 LAB
ABO + RH PNL BLD: NORMAL
B19V IGG SER QL IA: 3.73 INDEX
B19V IGG+IGM SER-IMP: NORMAL
B19V IGG+IGM SER-IMP: POSITIVE
B19V IGM FLD-ACNC: 0.11 INDEX
B19V IGM SER-ACNC: NEGATIVE
BASOPHILS # BLD AUTO: 0.01 K/UL
BASOPHILS NFR BLD AUTO: 0.1 %
BLD GP AB SCN SERPL QL: NORMAL
C TRACH RRNA SPEC QL NAA+PROBE: NOT DETECTED
EOSINOPHIL # BLD AUTO: 0.05 K/UL
EOSINOPHIL NFR BLD AUTO: 0.6 %
HBV SURFACE AG SER QL: NONREACTIVE
HCT VFR BLD CALC: 36.3 %
HCV AB SER QL: NONREACTIVE
HCV S/CO RATIO: 0.13 S/CO
HGB BLD-MCNC: 11.7 G/DL
HIV1+2 AB SPEC QL IA.RAPID: NONREACTIVE
IMM GRANULOCYTES NFR BLD AUTO: 0.3 %
LEAD BLD-MCNC: <1 UG/DL
LYMPHOCYTES # BLD AUTO: 2.47 K/UL
LYMPHOCYTES NFR BLD AUTO: 31.5 %
MAN DIFF?: NORMAL
MCHC RBC-ENTMCNC: 30 PG
MCHC RBC-ENTMCNC: 32.2 G/DL
MCV RBC AUTO: 93.1 FL
MEV IGG FLD QL IA: 98.5 AU/ML
MEV IGG+IGM SER-IMP: POSITIVE
MONOCYTES # BLD AUTO: 0.6 K/UL
MONOCYTES NFR BLD AUTO: 7.6 %
MUV AB SER-ACNC: NEGATIVE
MUV IGG SER QL IA: <5 AU/ML
N GONORRHOEA RRNA SPEC QL NAA+PROBE: NOT DETECTED
NEUTROPHILS # BLD AUTO: 4.7 K/UL
NEUTROPHILS NFR BLD AUTO: 59.9 %
PLATELET # BLD AUTO: 215 K/UL
RBC # BLD: 3.9 M/UL
RBC # FLD: 13.2 %
RUBV IGG FLD-ACNC: 6.36 INDEX
RUBV IGG SER-IMP: POSITIVE
SOURCE AMPLIFICATION: NORMAL
T PALLIDUM AB SER QL IA: NEGATIVE
VZV AB TITR SER: POSITIVE
VZV IGG SER IF-ACNC: 9.17 S/CO
WBC # FLD AUTO: 7.85 K/UL

## 2025-02-05 LAB — BACTERIA UR CULT: NORMAL

## 2025-03-11 ENCOUNTER — APPOINTMENT (OUTPATIENT)
Dept: OBGYN | Facility: CLINIC | Age: 35
End: 2025-03-11
Payer: MEDICAID

## 2025-03-11 VITALS
BODY MASS INDEX: 33.44 KG/M2 | SYSTOLIC BLOOD PRESSURE: 102 MMHG | HEART RATE: 96 BPM | WEIGHT: 177 LBS | DIASTOLIC BLOOD PRESSURE: 65 MMHG

## 2025-03-11 DIAGNOSIS — Z34.90 ENCOUNTER FOR SUPERVISION OF NORMAL PREGNANCY, UNSPECIFIED, UNSPECIFIED TRIMESTER: ICD-10-CM

## 2025-03-11 LAB
BILIRUB UR QL STRIP: NORMAL
GLUCOSE UR-MCNC: NORMAL
HCG UR QL: 0.2 EU/DL
HGB UR QL STRIP.AUTO: NORMAL
KETONES UR-MCNC: NORMAL
LEUKOCYTE ESTERASE UR QL STRIP: NORMAL
NITRITE UR QL STRIP: NORMAL
PH UR STRIP: 6
PROT UR STRIP-MCNC: NORMAL
SP GR UR STRIP: 1.02

## 2025-03-11 PROCEDURE — 99213 OFFICE O/P EST LOW 20 MIN: CPT | Mod: 1L,25

## 2025-03-11 PROCEDURE — 0501F PRENATAL FLOW SHEET: CPT

## 2025-03-11 PROCEDURE — 81003 URINALYSIS AUTO W/O SCOPE: CPT | Mod: QW

## 2025-03-13 LAB — BACTERIA UR CULT: NORMAL

## 2025-04-23 ENCOUNTER — ASOB RESULT (OUTPATIENT)
Age: 35
End: 2025-04-23

## 2025-04-23 ENCOUNTER — APPOINTMENT (OUTPATIENT)
Dept: OBGYN | Facility: CLINIC | Age: 35
End: 2025-04-23
Payer: MEDICAID

## 2025-04-23 ENCOUNTER — APPOINTMENT (OUTPATIENT)
Dept: ANTEPARTUM | Facility: CLINIC | Age: 35
End: 2025-04-23
Payer: MEDICAID

## 2025-04-23 VITALS
WEIGHT: 185 LBS | DIASTOLIC BLOOD PRESSURE: 68 MMHG | BODY MASS INDEX: 34.96 KG/M2 | SYSTOLIC BLOOD PRESSURE: 110 MMHG | HEART RATE: 96 BPM

## 2025-04-23 LAB
BILIRUB UR QL STRIP: NORMAL
GLUCOSE UR-MCNC: NORMAL
HCG UR QL: 0.2 EU/DL
HGB UR QL STRIP.AUTO: NORMAL
KETONES UR-MCNC: NORMAL
LEUKOCYTE ESTERASE UR QL STRIP: NORMAL
NITRITE UR QL STRIP: NORMAL
PH UR STRIP: 7
PROT UR STRIP-MCNC: NORMAL
SP GR UR STRIP: 1.01

## 2025-04-23 PROCEDURE — 99213 OFFICE O/P EST LOW 20 MIN: CPT | Mod: 1L,25

## 2025-04-23 PROCEDURE — 76811 OB US DETAILED SNGL FETUS: CPT

## 2025-04-23 PROCEDURE — 0502F SUBSEQUENT PRENATAL CARE: CPT

## 2025-04-23 PROCEDURE — 81003 URINALYSIS AUTO W/O SCOPE: CPT | Mod: QW

## 2025-05-20 ENCOUNTER — NON-APPOINTMENT (OUTPATIENT)
Age: 35
End: 2025-05-20

## 2025-05-21 ENCOUNTER — NON-APPOINTMENT (OUTPATIENT)
Age: 35
End: 2025-05-21

## 2025-05-21 ENCOUNTER — APPOINTMENT (OUTPATIENT)
Dept: OBGYN | Facility: CLINIC | Age: 35
End: 2025-05-21
Payer: MEDICAID

## 2025-05-21 VITALS
DIASTOLIC BLOOD PRESSURE: 65 MMHG | BODY MASS INDEX: 36.28 KG/M2 | SYSTOLIC BLOOD PRESSURE: 98 MMHG | WEIGHT: 192 LBS | HEART RATE: 98 BPM

## 2025-05-21 PROCEDURE — 0502F SUBSEQUENT PRENATAL CARE: CPT

## 2025-05-21 PROCEDURE — 81003 URINALYSIS AUTO W/O SCOPE: CPT | Mod: QW

## 2025-05-21 PROCEDURE — 99213 OFFICE O/P EST LOW 20 MIN: CPT | Mod: 1L,25

## 2025-05-21 PROCEDURE — 36415 COLL VENOUS BLD VENIPUNCTURE: CPT

## 2025-05-22 LAB
BASOPHILS # BLD AUTO: 0.01 K/UL
BASOPHILS NFR BLD AUTO: 0.1 %
EOSINOPHIL # BLD AUTO: 0.05 K/UL
EOSINOPHIL NFR BLD AUTO: 0.7 %
GLUCOSE 1H P 50 G GLC PO SERPL-MCNC: 114 MG/DL
HCT VFR BLD CALC: 33.3 %
HGB BLD-MCNC: 10.4 G/DL
IMM GRANULOCYTES NFR BLD AUTO: 0.6 %
LYMPHOCYTES # BLD AUTO: 1.76 K/UL
LYMPHOCYTES NFR BLD AUTO: 24.7 %
MAN DIFF?: NORMAL
MCHC RBC-ENTMCNC: 30.1 PG
MCHC RBC-ENTMCNC: 31.2 G/DL
MCV RBC AUTO: 96.2 FL
MONOCYTES # BLD AUTO: 0.53 K/UL
MONOCYTES NFR BLD AUTO: 7.4 %
NEUTROPHILS # BLD AUTO: 4.74 K/UL
NEUTROPHILS NFR BLD AUTO: 66.5 %
PLATELET # BLD AUTO: 211 K/UL
RBC # BLD: 3.46 M/UL
RBC # FLD: 13.7 %
WBC # FLD AUTO: 7.13 K/UL

## 2025-05-22 RX ORDER — CHLORHEXIDINE GLUCONATE 4 %
325 (65 FE) LIQUID (ML) TOPICAL
Qty: 30 | Refills: 2 | Status: ACTIVE | COMMUNITY
Start: 2025-05-22 | End: 1900-01-01

## 2025-05-27 ENCOUNTER — APPOINTMENT (OUTPATIENT)
Dept: OBGYN | Facility: CLINIC | Age: 35
End: 2025-05-27

## 2025-06-17 ENCOUNTER — APPOINTMENT (OUTPATIENT)
Dept: OBGYN | Facility: CLINIC | Age: 35
End: 2025-06-17